# Patient Record
Sex: MALE | Race: WHITE | NOT HISPANIC OR LATINO | Employment: OTHER | URBAN - METROPOLITAN AREA
[De-identification: names, ages, dates, MRNs, and addresses within clinical notes are randomized per-mention and may not be internally consistent; named-entity substitution may affect disease eponyms.]

---

## 2017-01-05 ENCOUNTER — RECORDS - HEALTHEAST (OUTPATIENT)
Dept: LAB | Facility: CLINIC | Age: 62
End: 2017-01-05

## 2017-01-05 ENCOUNTER — RECORDS - HEALTHEAST (OUTPATIENT)
Dept: ADMINISTRATIVE | Facility: OTHER | Age: 62
End: 2017-01-05

## 2017-01-06 ENCOUNTER — AMBULATORY - HEALTHEAST (OUTPATIENT)
Dept: VASCULAR SURGERY | Facility: CLINIC | Age: 62
End: 2017-01-06

## 2017-01-06 ENCOUNTER — RECORDS - HEALTHEAST (OUTPATIENT)
Dept: ADMINISTRATIVE | Facility: OTHER | Age: 62
End: 2017-01-06

## 2017-01-06 LAB
CHOLEST SERPL-MCNC: 204 MG/DL
FASTING STATUS PATIENT QL REPORTED: NO
HDLC SERPL-MCNC: 40 MG/DL
LDLC SERPL CALC-MCNC: 106 MG/DL
LDLC SERPL CALC-MCNC: ABNORMAL MG/DL
TRIGL SERPL-MCNC: 429 MG/DL

## 2017-01-13 ENCOUNTER — COMMUNICATION - HEALTHEAST (OUTPATIENT)
Dept: VASCULAR SURGERY | Facility: CLINIC | Age: 62
End: 2017-01-13

## 2017-01-16 ENCOUNTER — RECORDS - HEALTHEAST (OUTPATIENT)
Dept: ADMINISTRATIVE | Facility: OTHER | Age: 62
End: 2017-01-16

## 2017-01-16 ENCOUNTER — RECORDS - HEALTHEAST (OUTPATIENT)
Dept: VASCULAR ULTRASOUND | Facility: CLINIC | Age: 62
End: 2017-01-16

## 2017-01-16 ENCOUNTER — OFFICE VISIT - HEALTHEAST (OUTPATIENT)
Dept: VASCULAR SURGERY | Facility: CLINIC | Age: 62
End: 2017-01-16

## 2017-01-16 DIAGNOSIS — Z87.891 PERSONAL HISTORY OF NICOTINE DEPENDENCE: ICD-10-CM

## 2017-01-16 DIAGNOSIS — M21.379 FOOT DROP: ICD-10-CM

## 2017-01-16 DIAGNOSIS — I73.9 PAD (PERIPHERAL ARTERY DISEASE) (H): ICD-10-CM

## 2017-01-16 DIAGNOSIS — E78.5 HYPERLIPIDEMIA: ICD-10-CM

## 2017-01-16 DIAGNOSIS — I73.9 PERIPHERAL VASCULAR DISEASE, UNSPECIFIED (H): ICD-10-CM

## 2017-01-16 DIAGNOSIS — Z87.891 HISTORY OF SMOKING: ICD-10-CM

## 2017-01-16 DIAGNOSIS — I73.9 CLAUDICATION (H): ICD-10-CM

## 2017-01-16 DIAGNOSIS — E78.5 HYPERLIPIDEMIA, UNSPECIFIED: ICD-10-CM

## 2017-01-16 ASSESSMENT — MIFFLIN-ST. JEOR: SCORE: 1723.4

## 2017-03-03 ENCOUNTER — RECORDS - HEALTHEAST (OUTPATIENT)
Dept: LAB | Facility: HOSPITAL | Age: 62
End: 2017-03-03

## 2017-03-03 LAB — HBA1C MFR BLD: 7.1 % (ref 4.2–6.1)

## 2017-03-06 LAB
ALBUMIN PERCENT: 62 % (ref 51–67)
ALBUMIN SERPL ELPH-MCNC: 4.2 G/DL (ref 3.2–4.7)
ALPHA 1 PERCENT: 2.3 % (ref 2–4)
ALPHA 2 PERCENT: 10 % (ref 5–13)
ALPHA1 GLOB SERPL ELPH-MCNC: 0.2 G/DL (ref 0.1–0.3)
ALPHA2 GLOB SERPL ELPH-MCNC: 0.7 G/DL (ref 0.4–0.9)
B-GLOBULIN SERPL ELPH-MCNC: 1 G/DL (ref 0.7–1.2)
BETA PERCENT: 14.7 % (ref 10–17)
GAMMA GLOB SERPL ELPH-MCNC: 0.7 G/DL (ref 0.6–1.4)
GAMMA GLOBULIN PERCENT: 11 % (ref 9–20)
PATH ICD:: NORMAL
PROT PATTERN SERPL ELPH-IMP: NORMAL
PROT SERPL-MCNC: 6.8 G/DL (ref 6–8)
REVIEWING PATHOLOGIST: NORMAL

## 2017-03-07 LAB
ANA SER QL: 0.2 U
SS-A/RO AUTOANTIBODIES - HISTORICAL: 2 EU
SS-B/LA AUTOANTIBODIES - HISTORICAL: 0 EU

## 2017-09-07 ENCOUNTER — RECORDS - HEALTHEAST (OUTPATIENT)
Dept: LAB | Facility: CLINIC | Age: 62
End: 2017-09-07

## 2017-09-07 LAB
CHOLEST SERPL-MCNC: 150 MG/DL
FASTING STATUS PATIENT QL REPORTED: NORMAL
HDLC SERPL-MCNC: 42 MG/DL
LDLC SERPL CALC-MCNC: 90 MG/DL
TRIGL SERPL-MCNC: 91 MG/DL

## 2018-12-10 ENCOUNTER — RECORDS - HEALTHEAST (OUTPATIENT)
Dept: LAB | Facility: CLINIC | Age: 63
End: 2018-12-10

## 2018-12-10 LAB
ALBUMIN SERPL-MCNC: 4 G/DL (ref 3.5–5)
ALP SERPL-CCNC: 81 U/L (ref 45–120)
ALT SERPL W P-5'-P-CCNC: 30 U/L (ref 0–45)
ANION GAP SERPL CALCULATED.3IONS-SCNC: 10 MMOL/L (ref 5–18)
AST SERPL W P-5'-P-CCNC: 24 U/L (ref 0–40)
BASOPHILS # BLD AUTO: 0.1 THOU/UL (ref 0–0.2)
BASOPHILS NFR BLD AUTO: 1 % (ref 0–2)
BILIRUB SERPL-MCNC: 0.5 MG/DL (ref 0–1)
BUN SERPL-MCNC: 10 MG/DL (ref 8–22)
CALCIUM SERPL-MCNC: 9.6 MG/DL (ref 8.5–10.5)
CHLORIDE BLD-SCNC: 106 MMOL/L (ref 98–107)
CHOLEST SERPL-MCNC: 181 MG/DL
CO2 SERPL-SCNC: 25 MMOL/L (ref 22–31)
CREAT SERPL-MCNC: 0.85 MG/DL (ref 0.7–1.3)
EOSINOPHIL # BLD AUTO: 0.6 THOU/UL (ref 0–0.4)
EOSINOPHIL NFR BLD AUTO: 9 % (ref 0–6)
ERYTHROCYTE [DISTWIDTH] IN BLOOD BY AUTOMATED COUNT: 11.9 % (ref 11–14.5)
FASTING STATUS PATIENT QL REPORTED: YES
GFR SERPL CREATININE-BSD FRML MDRD: >60 ML/MIN/1.73M2
GLUCOSE BLD-MCNC: 148 MG/DL (ref 70–125)
HCT VFR BLD AUTO: 45.3 % (ref 40–54)
HDLC SERPL-MCNC: 57 MG/DL
HGB BLD-MCNC: 15.1 G/DL (ref 14–18)
LDLC SERPL CALC-MCNC: 103 MG/DL
LYMPHOCYTES # BLD AUTO: 1.6 THOU/UL (ref 0.8–4.4)
LYMPHOCYTES NFR BLD AUTO: 25 % (ref 20–40)
MCH RBC QN AUTO: 31.6 PG (ref 27–34)
MCHC RBC AUTO-ENTMCNC: 33.3 G/DL (ref 32–36)
MCV RBC AUTO: 95 FL (ref 80–100)
MONOCYTES # BLD AUTO: 0.5 THOU/UL (ref 0–0.9)
MONOCYTES NFR BLD AUTO: 7 % (ref 2–10)
NEUTROPHILS # BLD AUTO: 3.8 THOU/UL (ref 2–7.7)
NEUTROPHILS NFR BLD AUTO: 59 % (ref 50–70)
PLATELET # BLD AUTO: 212 THOU/UL (ref 140–440)
PMV BLD AUTO: 11 FL (ref 8.5–12.5)
POTASSIUM BLD-SCNC: 4.6 MMOL/L (ref 3.5–5)
PROT SERPL-MCNC: 7 G/DL (ref 6–8)
PSA SERPL-MCNC: 0.2 NG/ML (ref 0–4.5)
RBC # BLD AUTO: 4.78 MILL/UL (ref 4.4–6.2)
SODIUM SERPL-SCNC: 141 MMOL/L (ref 136–145)
TRIGL SERPL-MCNC: 107 MG/DL
VIT B12 SERPL-MCNC: 561 PG/ML (ref 213–816)
WBC: 6.4 THOU/UL (ref 4–11)

## 2020-01-02 ENCOUNTER — RECORDS - HEALTHEAST (OUTPATIENT)
Dept: LAB | Facility: CLINIC | Age: 65
End: 2020-01-02

## 2020-01-02 LAB
ALBUMIN SERPL-MCNC: 4.2 G/DL (ref 3.5–5)
ALP SERPL-CCNC: 80 U/L (ref 45–120)
ALT SERPL W P-5'-P-CCNC: 37 U/L (ref 0–45)
ANION GAP SERPL CALCULATED.3IONS-SCNC: 12 MMOL/L (ref 5–18)
AST SERPL W P-5'-P-CCNC: 33 U/L (ref 0–40)
BILIRUB SERPL-MCNC: 0.7 MG/DL (ref 0–1)
BUN SERPL-MCNC: 18 MG/DL (ref 8–22)
CALCIUM SERPL-MCNC: 9.3 MG/DL (ref 8.5–10.5)
CHLORIDE BLD-SCNC: 104 MMOL/L (ref 98–107)
CHOLEST SERPL-MCNC: 174 MG/DL
CO2 SERPL-SCNC: 21 MMOL/L (ref 22–31)
CREAT SERPL-MCNC: 0.92 MG/DL (ref 0.7–1.3)
FASTING STATUS PATIENT QL REPORTED: YES
GFR SERPL CREATININE-BSD FRML MDRD: >60 ML/MIN/1.73M2
GLUCOSE BLD-MCNC: 177 MG/DL (ref 70–125)
HDLC SERPL-MCNC: 45 MG/DL
LDLC SERPL CALC-MCNC: 98 MG/DL
POTASSIUM BLD-SCNC: 4.1 MMOL/L (ref 3.5–5)
PROT SERPL-MCNC: 7.2 G/DL (ref 6–8)
PSA SERPL-MCNC: 0.2 NG/ML (ref 0–4.5)
SODIUM SERPL-SCNC: 137 MMOL/L (ref 136–145)
TRIGL SERPL-MCNC: 153 MG/DL
VIT B12 SERPL-MCNC: 482 PG/ML (ref 213–816)

## 2020-01-03 LAB — HBA1C MFR BLD: 6.9 % (ref 4.2–6.1)

## 2020-11-10 ENCOUNTER — RECORDS - HEALTHEAST (OUTPATIENT)
Dept: LAB | Facility: CLINIC | Age: 65
End: 2020-11-10

## 2020-11-10 LAB
ANION GAP SERPL CALCULATED.3IONS-SCNC: 10 MMOL/L (ref 5–18)
BUN SERPL-MCNC: 17 MG/DL (ref 8–22)
CALCIUM SERPL-MCNC: 9.4 MG/DL (ref 8.5–10.5)
CHLORIDE BLD-SCNC: 101 MMOL/L (ref 98–107)
CO2 SERPL-SCNC: 25 MMOL/L (ref 22–31)
CREAT SERPL-MCNC: 0.96 MG/DL (ref 0.7–1.3)
GFR SERPL CREATININE-BSD FRML MDRD: >60 ML/MIN/1.73M2
GLUCOSE BLD-MCNC: 209 MG/DL (ref 70–125)
POTASSIUM BLD-SCNC: 5.2 MMOL/L (ref 3.5–5)
SODIUM SERPL-SCNC: 136 MMOL/L (ref 136–145)

## 2020-11-17 ENCOUNTER — RECORDS - HEALTHEAST (OUTPATIENT)
Dept: LAB | Facility: CLINIC | Age: 65
End: 2020-11-17

## 2020-11-17 LAB
SARS-COV-2 PCR COMMENT: NORMAL
SARS-COV-2 RNA SPEC QL NAA+PROBE: NEGATIVE
SARS-COV-2 VIRUS SPECIMEN SOURCE: NORMAL

## 2021-01-25 ENCOUNTER — RECORDS - HEALTHEAST (OUTPATIENT)
Dept: ADMINISTRATIVE | Facility: OTHER | Age: 66
End: 2021-01-25

## 2021-02-19 ENCOUNTER — AMBULATORY - HEALTHEAST (OUTPATIENT)
Dept: SURGERY | Facility: CLINIC | Age: 66
End: 2021-02-19

## 2021-02-19 DIAGNOSIS — Z11.59 ENCOUNTER FOR SCREENING FOR OTHER VIRAL DISEASES: ICD-10-CM

## 2021-02-24 ENCOUNTER — RECORDS - HEALTHEAST (OUTPATIENT)
Dept: LAB | Facility: CLINIC | Age: 66
End: 2021-02-24

## 2021-02-24 LAB
CHOLEST SERPL-MCNC: 166 MG/DL
FASTING STATUS PATIENT QL REPORTED: NO
HDLC SERPL-MCNC: 33 MG/DL
LDLC SERPL CALC-MCNC: 87 MG/DL
TRIGL SERPL-MCNC: 228 MG/DL

## 2021-02-24 ASSESSMENT — MIFFLIN-ST. JEOR
SCORE: 1662.16
SCORE: 1662.16

## 2021-02-25 ENCOUNTER — AMBULATORY - HEALTHEAST (OUTPATIENT)
Dept: LAB | Facility: CLINIC | Age: 66
End: 2021-02-25

## 2021-02-25 DIAGNOSIS — Z11.59 ENCOUNTER FOR SCREENING FOR OTHER VIRAL DISEASES: ICD-10-CM

## 2021-02-27 ENCOUNTER — COMMUNICATION - HEALTHEAST (OUTPATIENT)
Dept: SCHEDULING | Facility: CLINIC | Age: 66
End: 2021-02-27

## 2021-02-28 ENCOUNTER — ANESTHESIA - HEALTHEAST (OUTPATIENT)
Dept: SURGERY | Facility: CLINIC | Age: 66
End: 2021-02-28

## 2021-03-01 ENCOUNTER — HOSPITAL ENCOUNTER (INPATIENT)
Dept: MEDSURG UNIT | Facility: CLINIC | Age: 66
Discharge: HOME | End: 2021-03-04
Attending: ORTHOPAEDIC SURGERY | Admitting: ORTHOPAEDIC SURGERY
Payer: COMMERCIAL

## 2021-03-01 ENCOUNTER — SURGERY - HEALTHEAST (OUTPATIENT)
Dept: SURGERY | Facility: CLINIC | Age: 66
End: 2021-03-01

## 2021-03-01 DIAGNOSIS — M48.062 NEUROGENIC CLAUDICATION DUE TO LUMBAR SPINAL STENOSIS: ICD-10-CM

## 2021-03-01 LAB
GLUCOSE BLDC GLUCOMTR-MCNC: 231 MG/DL (ref 70–139)
GLUCOSE BLDC GLUCOMTR-MCNC: 249 MG/DL (ref 70–139)
GLUCOSE BLDC GLUCOMTR-MCNC: 288 MG/DL (ref 70–139)
GLUCOSE BLDC GLUCOMTR-MCNC: 291 MG/DL (ref 70–139)
HGB BLD-MCNC: 14 G/DL (ref 14–18)

## 2021-03-01 ASSESSMENT — MIFFLIN-ST. JEOR
SCORE: 1640.38
SCORE: 1640.38

## 2021-03-02 LAB
GLUCOSE BLDC GLUCOMTR-MCNC: 209 MG/DL (ref 70–139)
GLUCOSE BLDC GLUCOMTR-MCNC: 213 MG/DL (ref 70–139)
GLUCOSE BLDC GLUCOMTR-MCNC: 240 MG/DL (ref 70–139)
GLUCOSE BLDC GLUCOMTR-MCNC: 307 MG/DL (ref 70–139)

## 2021-03-03 LAB
GLUCOSE BLDC GLUCOMTR-MCNC: 141 MG/DL (ref 70–139)
GLUCOSE BLDC GLUCOMTR-MCNC: 173 MG/DL (ref 70–139)
GLUCOSE BLDC GLUCOMTR-MCNC: 212 MG/DL (ref 70–139)
GLUCOSE BLDC GLUCOMTR-MCNC: 248 MG/DL (ref 70–139)

## 2021-03-03 ASSESSMENT — MIFFLIN-ST. JEOR
SCORE: 1640.38
SCORE: 1640.38

## 2021-03-04 LAB
GLUCOSE BLDC GLUCOMTR-MCNC: 167 MG/DL (ref 70–139)
HGB BLD-MCNC: 12.6 G/DL (ref 14–18)

## 2021-03-12 ENCOUNTER — RECORDS - HEALTHEAST (OUTPATIENT)
Dept: LAB | Facility: CLINIC | Age: 66
End: 2021-03-12

## 2021-03-12 LAB
ALBUMIN SERPL-MCNC: 3.7 G/DL (ref 3.5–5)
ALP SERPL-CCNC: 109 U/L (ref 45–120)
ALT SERPL W P-5'-P-CCNC: 20 U/L (ref 0–45)
ANION GAP SERPL CALCULATED.3IONS-SCNC: 10 MMOL/L (ref 5–18)
AST SERPL W P-5'-P-CCNC: 17 U/L (ref 0–40)
BILIRUB SERPL-MCNC: 0.3 MG/DL (ref 0–1)
BUN SERPL-MCNC: 12 MG/DL (ref 8–22)
CALCIUM SERPL-MCNC: 9.1 MG/DL (ref 8.5–10.5)
CHLORIDE BLD-SCNC: 104 MMOL/L (ref 98–107)
CO2 SERPL-SCNC: 24 MMOL/L (ref 22–31)
CREAT SERPL-MCNC: 0.85 MG/DL (ref 0.7–1.3)
FERRITIN SERPL-MCNC: 301 NG/ML (ref 27–300)
GFR SERPL CREATININE-BSD FRML MDRD: >60 ML/MIN/1.73M2
GLUCOSE BLD-MCNC: 214 MG/DL (ref 70–125)
IRON SATN MFR SERPL: 17 % (ref 20–50)
IRON SERPL-MCNC: 55 UG/DL (ref 42–175)
POTASSIUM BLD-SCNC: 4.3 MMOL/L (ref 3.5–5)
PROT SERPL-MCNC: 6.9 G/DL (ref 6–8)
PSA SERPL-MCNC: 0.2 NG/ML (ref 0–4.5)
SODIUM SERPL-SCNC: 138 MMOL/L (ref 136–145)
TIBC SERPL-MCNC: 318 UG/DL (ref 313–563)
TRANSFERRIN SERPL-MCNC: 255 MG/DL (ref 212–360)

## 2021-05-30 VITALS — HEIGHT: 71 IN | WEIGHT: 202 LBS | BODY MASS INDEX: 28.28 KG/M2

## 2021-06-05 VITALS
WEIGHT: 187.2 LBS | BODY MASS INDEX: 26.8 KG/M2 | BODY MASS INDEX: 26.8 KG/M2 | HEIGHT: 70 IN | HEIGHT: 70 IN | WEIGHT: 187.2 LBS

## 2021-06-08 NOTE — PROGRESS NOTES
" VASCULAR SURGERY CLINIC CONSULTATION    VASCULAR SURGEON: Alcides Weinstein MD     LOCATION:  [place] VASCULAR [facility]    Hardeep Silvestre   Medical Record #:  [id number]  YOB: 1955  Age:  61 y.o.     Date of Service: 1/16/2017    PRIMARY CARE PROVIDER: Levy Rodriguez MD  Requesting Provider: Levy Rodriguez MD     Reason for visit:  Foot numbness, pain and calf pain    IMPRESSION:    Patient's distribution of symptoms is most consistent with a neuropathy.There may also be a component of arterial insufficiency.    RECOMMENDATION:    Physiologic arterial evaluation (ie., CECY's with and without exercise)  Further management is pending these results  He should have Physical Therapy Evaluation and Treatment for core strengthening and exercise program.  If he does have arterial insufficiency, he will be started on an exercise program.  All questions answered. Patient and his wife agree with management.    ADDENDUM: Segmental limb pressures and CECY's w/woexercise were reviewed. These are consistent with SFA/Popliteal artery occlusive disease, bilaterally. This may accunt for the calf cramping with ambulation,but not the neuropthic process.  I had a lenghty discussion with patient ahnd his wife regarding natural history and treatment opotions. He will initially be treataed with Claudication walking program. He will follow up with me in 3-6 mos. Further mgmt will be dependent on his response to the walking program, Physical therapy and risk factor optimization. He would benefit from a PHYSICAL THRAPY evaluation and treatment for his left foot drop and back disorder. He may benefit from a left AFO.  All questions answered. Patient and his wife agree with management.      HPI:  Hardeep Silvestre is a 61 y.o. male who was seen today in consultation for foot and leg pain/burning.  He states that he has constant \"numbness \" in his feet (plantar aspect) which has sensation of frostbite when he walks. He also " develops pain in calf,bilaterally after walking approx 50 yards. He doses walk significantly at work. He feels as if his calf (bilat) has been hit with a baseball bat at rest and with walking. He has had back surgery (Lumbar) twice. The initial surgery relieved his thigh cramping. Prior heavy smoker of cigarettes(1-2 PPD for 30 years). No cigarettes since 2003. He has known CAD and is s/p PTCA in 2003.No current CP.  He states that his diabetes tests have been at the lower end  For diabetes mellitus (actual test performed and results is not available). He is not on any exercise program. He had PT through [facility] prior to initial back surgery he felt that he developed right leg pain in addition to left as a result. He has not had any physical therapy or been on any exercise program for several yeaars.d      PHH:    Past Medical History   Diagnosis Date     Claudication      PAD (peripheral artery disease)         Past Surgical History   Procedure Laterality Date     Shoulder surgery       Coronary angioplasty with stent placement         ALLERGIES:  Review of patient's allergies indicates no known allergies.    MEDS:    Current Outpatient Prescriptions:      aspirin 81 MG EC tablet, Take 81 mg by mouth daily., Disp: , Rfl:      atenolol (TENORMIN) 25 MG tablet, TK 1 T PO ONCE D FOR HIGH BLOOD PRESSURE, Disp: , Rfl: 3     atorvastatin (LIPITOR) 10 MG tablet, TK 1 T PO  ONCE D HS, Disp: , Rfl: 3     fluticasone (CUTIVATE) 0.05 % cream, ENDER TO FACE BID FOR 1 WEEK THEN REDUCE TO 3 DAYS PER WEEK PRN, Disp: , Rfl: 1     gabapentin (NEURONTIN) 100 MG capsule, TK 1 C PO TID, Disp: , Rfl: 2     omeprazole (PRILOSEC) 20 MG capsule, Take 20 mg by mouth Daily before breakfast., Disp: , Rfl:      nitroglycerin (NITROSTAT) 0.4 MG SL tablet, Place 0.4 mg under the tongue every 5 (five) minutes as needed for chest pain., Disp: , Rfl:     SOCIAL HABITS:    History   Smoking Status     Former Smoker      "Packs/day: 2.00     Types: Cigarettes     Quit date: 1/16/2003   Smokeless Tobacco     Never Used       History   Alcohol Use     Yes       History   Drug Use No       FAMILY HISTORY:    Family History   Problem Relation Age of Onset     Diabetes Mother      Liver disease Mother      Coronary artery disease Father      Heart failure Father        REVIEW OF SYSTEMS:  12 point ros reviewed    PE:  Visit Vitals     /60 (Patient Site: Left Arm, Patient Position: Sitting, Cuff Size: Adult Regular)     Pulse 78     Resp 14     Ht 5' 11\" (1.803 m)     Wt 202 lb (91.6 kg)     BMI 28.17 kg/m2       HEENT:  nl   Chest:  nl  Lungs:  No wheezing  Heart:  Rrr. + pulses at car,rad,fem, dp palpable,bilat. No popliteal, femoral or abdominal aortic aneurysms palpable  Abd:  Soft. No tenderness or masses  Ext:  No c/c/e  Skin: intact  Neuro:Strenght5/5 through out. Normal sensation to light touch in feet and legs. No pain with straight leg lift with and without resistance      DIAGNOSTIC STUDIES:    CECY's with exercise pending                    Alcides Weinstein MD  VASCULAR SURGERY     [facility]      "

## 2021-06-08 NOTE — PROGRESS NOTES
61 year old male, consult for PVD, direct ref to Dr. Alcides Weinstein. Patient did undergoe EMG w/ [facility]. Patient has Hx of leg and foot numbness, on gapentin, EMG was negative for neuropathy.  MRI done at [facility] for eval of spinal stenosis, has had spinal surgery in the past. Patient reports claudication in calves after roughly 50 feet.  patient has some hair on his legs and toes. Patient has left foot drop.

## 2021-06-10 ENCOUNTER — OFFICE VISIT (OUTPATIENT)
Dept: NEUROLOGY | Facility: CLINIC | Age: 66
End: 2021-06-10
Payer: COMMERCIAL

## 2021-06-10 VITALS
SYSTOLIC BLOOD PRESSURE: 177 MMHG | DIASTOLIC BLOOD PRESSURE: 103 MMHG | BODY MASS INDEX: 27.63 KG/M2 | HEART RATE: 69 BPM | HEIGHT: 70 IN | WEIGHT: 193 LBS

## 2021-06-10 DIAGNOSIS — R79.9 ABNORMAL FINDING OF BLOOD CHEMISTRY, UNSPECIFIED: ICD-10-CM

## 2021-06-10 DIAGNOSIS — G62.9 PERIPHERAL POLYNEUROPATHY: Primary | ICD-10-CM

## 2021-06-10 PROBLEM — I73.9 PERIPHERAL ARTERIAL DISEASE (H): Status: ACTIVE | Noted: 2017-07-21

## 2021-06-10 PROBLEM — I25.10 CORONARY ARTERY DISEASE INVOLVING NATIVE CORONARY ARTERY OF NATIVE HEART WITHOUT ANGINA PECTORIS: Status: ACTIVE | Noted: 2021-06-10

## 2021-06-10 PROBLEM — K90.0 CELIAC DISEASE: Status: ACTIVE | Noted: 2017-07-16

## 2021-06-10 PROBLEM — E78.5 HYPERLIPIDEMIA: Status: ACTIVE | Noted: 2017-07-31

## 2021-06-10 PROBLEM — I10 ESSENTIAL HYPERTENSION: Status: ACTIVE | Noted: 2021-05-11

## 2021-06-10 PROBLEM — M48.062 NEUROGENIC CLAUDICATION DUE TO LUMBAR SPINAL STENOSIS: Status: ACTIVE | Noted: 2021-03-01

## 2021-06-10 PROBLEM — E11.69 TYPE 2 DIABETES MELLITUS WITH OTHER SPECIFIED COMPLICATION, WITHOUT LONG-TERM CURRENT USE OF INSULIN (H): Status: ACTIVE | Noted: 2021-05-11

## 2021-06-10 PROCEDURE — 99205 OFFICE O/P NEW HI 60 MIN: CPT | Performed by: PSYCHIATRY & NEUROLOGY

## 2021-06-10 RX ORDER — NITROGLYCERIN 0.4 MG/1
0.4 TABLET SUBLINGUAL
COMMUNITY

## 2021-06-10 RX ORDER — LOSARTAN POTASSIUM 50 MG/1
50 TABLET ORAL
COMMUNITY
Start: 2021-05-11

## 2021-06-10 RX ORDER — ACETAMINOPHEN 500 MG
1000 TABLET ORAL
COMMUNITY
Start: 2021-03-04

## 2021-06-10 RX ORDER — TRAMADOL HYDROCHLORIDE 50 MG/1
50 TABLET ORAL EVERY 8 HOURS PRN
Qty: 90 TABLET | Refills: 0 | Status: SHIPPED | OUTPATIENT
Start: 2021-06-10 | End: 2021-08-03

## 2021-06-10 RX ORDER — ASPIRIN 81 MG
1 TABLET, DELAYED RELEASE (ENTERIC COATED) ORAL
COMMUNITY

## 2021-06-10 RX ORDER — CLOPIDOGREL BISULFATE 75 MG/1
75 TABLET ORAL
COMMUNITY
Start: 2021-05-20 | End: 2022-01-10

## 2021-06-10 RX ORDER — METFORMIN HCL 500 MG
1000 TABLET, EXTENDED RELEASE 24 HR ORAL
COMMUNITY
Start: 2021-05-25

## 2021-06-10 RX ORDER — ROSUVASTATIN CALCIUM 20 MG/1
20 TABLET, COATED ORAL
COMMUNITY
Start: 2021-05-25

## 2021-06-10 RX ORDER — GABAPENTIN 300 MG/1
CAPSULE ORAL
COMMUNITY
Start: 2021-04-16 | End: 2021-08-09

## 2021-06-10 RX ORDER — ATENOLOL 25 MG/1
25 TABLET ORAL
COMMUNITY
Start: 2021-05-25

## 2021-06-10 SDOH — HEALTH STABILITY: MENTAL HEALTH: HOW MANY STANDARD DRINKS CONTAINING ALCOHOL DO YOU HAVE ON A TYPICAL DAY?: 1 OR 2

## 2021-06-10 SDOH — HEALTH STABILITY: MENTAL HEALTH: HOW OFTEN DO YOU HAVE 6 OR MORE DRINKS ON ONE OCCASION?: NOT ASKED

## 2021-06-10 SDOH — HEALTH STABILITY: MENTAL HEALTH: HOW OFTEN DO YOU HAVE A DRINK CONTAINING ALCOHOL?: 2-3 TIMES A WEEK

## 2021-06-10 ASSESSMENT — MIFFLIN-ST. JEOR: SCORE: 1666.69

## 2021-06-10 NOTE — NURSING NOTE
Chief Complaint   Patient presents with     NEUROPATHY     BLE paresthesias- has had for many years but worsening      Kisha Paz CMA on 6/10/2021 at 11:18 AM

## 2021-06-10 NOTE — LETTER
6/10/2021        RE: Hardeep Silvestre  [address] [place] [address]        NEUROLOGY CONSULTATION NOTE       [facility] NEUROLOGY [employer]  [address] [place] [address]  Tel: [phone]  Fax: [phone]  [email or web address]     Hardeep Silvestre,  1955, MRN [id number]  PCP: Gregory Tariq  Date: 06/10/2021     ASSESSMENT & PLAN     Diagnosis code  1. Peripheral polyneuropathy     Peripheral polyneuropathy likely secondary to DM  65-year-old male with history of coronary artery disease, peripheral arterial disease, lumbar radiculopathy status post fusion, diabetes with a long history of neuropathy previously evaluated at [facility] and also in clinic in 2017 returns for follow-up with similar symptoms.  Clinically he has peripheral neuropathy likely due to his diabetes but previous EMG were unremarkable and showed chronic L5 changes due to his previous radiculopathy.  This was labeled as a small fiber neuropathy.  Currently patient is in significant discomfort and I suspect his symptoms are due to his peripheral arterial disease, peripheral neuropathy and chronic back issues that required fusion.  I have recommended:    1.  EMG of the lower extremity  2.  Repeat lab work to include antinuclear antibody, angiotensin-converting enzyme, folate, hemoglobin A1c, Lyme titer, methylmalonic acid level, SPEP, immunofixation, vitamin B1, vitamin B6 and vitamin B12  3.  Continue gabapentin 300 mg 1 tablet in the morning, 1 tablet in the afternoon and 3 at bedtime  4.  Add tramadol 50 mg 3 times daily if needed.  After above work-up I plan on switching him to nortriptyline or Cymbalta.  I should note in 2017 he was tried on combination of gabapentin and Cymbalta but did not find it very effective  5.  I have encouraged him to cut down on his drinking and have a tighter glycemic control with goal of hemoglobin A1c less than 6  6.  Follow-up after above tests.    Thank  you again for this referral, please feel free to contact me if you have any questions.    Jamal Barahona MD  [facility]  (Formerly, [facility]     REASON FOR CONSULTATION NEUROPATHY        HISTORY OF PRESENT ILLNESS     We have been requested by Dr. Gaona to evaluate Hardeep Silvestre who is a 65 year old  male for peripheral neuropathy    Patient is a 65-year-old male with history of coronary artery disease, peripheral arterial disease, hypertension, hyperlipidemia, spinal stenosis status post lumbar fusion was referred for evaluation of bilateral feet numbness and tingling that started more than 17 years ago.  Initially patient had some back pain and was noted to have left L5 radiculopathy.  He developed foot drop and had surgery done with improvement.  About 3 years ago due to persistent symptoms he was seen at [facility] and had a EMG that showed chronic left L5 radiculopathy but no compelling evidence of a large fiber peripheral neuropathy.  He had autonomic reflex screening at that time also that raise the possibility of early small fiber neuropathy.  Afterwards he also was evaluated by vascular surgery and had stent placed and was told that there is nothing much that can be done.  According to patient after evaluation at [facility] he was put on gabapentin for a short duration that he discontinued.  Recently his symptoms got worse and was evaluated by orthopedics and he had surgery done in March 2021 and had lumbar fusion done.  However he did not notice any improvement.  Although he is tried on different medication he feels only time he had any improvement was when he was on tramadol.  In addition to burning and tingling sensation in his leg he also reports impotence.  There is no history of any excessive constipation and postural hypotension.    Patient was evaluated in our clinic in 2017 with similar symptoms and at that time had an elevated  hemoglobin A1c at 7.1, SPEP, antinuclear antibody, SSA/SSB antibody, rheumatoid factor, Lyme titer were normal.  He was tried on the Loxitane and gabapentin but did not find it very effective.     PROBLEM LIST   Patient Active Problem List   Diagnosis Code     Coronary artery disease involving native coronary artery of native heart without angina pectoris I25.10     Celiac disease K90.0     Type 2 diabetes mellitus with other specified complication, without long-term current use of insulin (H) E11.69     Essential hypertension I10     Hyperlipidemia E78.5     Neurogenic claudication due to lumbar spinal stenosis M48.062     Peripheral arterial disease (H) I73.9         PAST MEDICAL & SURGICAL HISTORY     Past Medical History:   Patient  has no past medical history on file.    Surgical History:  He  has a past surgical history that includes BILATERAL LUMBAR 3-4, LUMBAR 4-5 TRANSFORAMINAL LUMBAR INTERBODY FUSION; CORONARY ANGIOPLASTY WITH STENT PLACEMENT; and hernia repair.     SOCIAL HISTORY     Reviewed, and he  reports that he quit smoking about 18 years ago. His smoking use included cigarettes. He smoked 2.00 packs per day. He has never used smokeless tobacco. He reports current alcohol use.     FAMILY HISTORY     Reviewed, and family history includes Coronary Artery Disease in his father; Diabetes in his mother; Heart Failure in his father; Liver Disease in his mother.     ALLERGIES     Allergies   Allergen Reactions     Macadamia Nut Oil Anaphylaxis     Atorvastatin Muscle Pain (Myalgia)         REVIEW OF SYSTEMS     A 12 point review of system was performed and was negative except as outlined in the history of present illness.     HOME MEDICATIONS     Current Outpatient Rx   Medication Sig Dispense Refill     acetaminophen (TYLENOL) 500 MG tablet Take 1,000 mg by mouth       aspirin (ASA) 81 MG EC tablet Take 81 mg by mouth       atenolol (TENORMIN) 25 MG tablet Take 25 mg by mouth       clopidogrel (PLAVIX) 75  "MG tablet Take 75 mg by mouth       gabapentin (NEURONTIN) 300 MG capsule TAKE 1 CAPSULE BY MOUTH EVERY MORNING AND 1 CAPSULE IN THE AFTERNOON AND 3 CAPSULES AT BEDTIME       losartan (COZAAR) 50 MG tablet Take 50 mg by mouth       metFORMIN (GLUCOPHAGE-XR) 500 MG 24 hr tablet Take 1,000 mg by mouth       multivitamin, therapeutic with minerals (THERA-VIT-M) TABS tablet Take 1 tablet by mouth       nitroGLYcerin (NITROSTAT) 0.4 MG sublingual tablet Place 0.4 mg under the tongue       omeprazole (PRILOSEC) 20 MG DR capsule Take 20 mg by mouth       rosuvastatin (CRESTOR) 20 MG tablet Take 20 mg by mouth       traMADol (ULTRAM) 50 MG tablet Take 1 tablet (50 mg) by mouth every 8 hours as needed for pain 90 tablet 0         PHYSICAL EXAM     Vital signs  BP (!) 177/103 (BP Location: Right arm, Patient Position: Sitting)   Pulse 69   Ht 1.778 m (5' 10\")   Wt 87.5 kg (193 lb)   BMI 27.69 kg/m      Weight:   193 lbs 0 oz    Patient is alert and oriented x4 in no acute distress. Vital signs were reviewed and are documented in electronic medical record. Neck was supple, no carotid bruits, thyromegaly, JVD, or lymphadenopathy was noted.   NEUROLOGY EXAM:    Patient s speech was normal with no aphasia or dysarthria. Mentation, and affect were also normal.     Funduscopic exam was normal, with normal cup to disc ratio. Cranial nerves II -XII were intact.     Patient had normal mass, tone and motor strength was 5/5 in all extremities without pronator drift.     Sensation was decreased to light touch pinprick vibratory and temperature sensation below knees    Reflexes were 2+ in the upper extremity 1+ at knees absent at ankles    No dysmetria noted on FNF or HKS. Romberg was negative.    Gait testing was normal. Able to walk on toes/heels. Tandem walk normal.     DIAGNOSTIC STUDIES     PERTINENT RADIOLOGY  Following imaging studies were reviewed:     MRI LUMBAR SPINE 1/25/2021  1.  Unchanged mild L4-L5 degenerative disc " disease with an unchanged small diffuse posterior disc bulge and previous postoperative changes of her treatment compressive laminectomy.  There is a small diffuse posterior disc bulge contributes to a mild midline spinal canal stenosis with mild narrowing of the right lateral recess, where there is redemonstrated slight contact of the traversing right L5 nerve root  2.  Unchanged moderate midline L3-L4 spinal canal stenosis with slight narrowing of the right lateral recess and mild narrowing of the left L3-L4 lateral recess.  3.  Unchanged moderate bilateral L3-L4 and moderate left L4-L5 neuroforaminal stenosis  4.  Moderate bilateral L3-L4 facet arthropathy with interval development of small facet joint effusion.  This is accompanied by a small amount of adjacent degenerative osseous edema  5.  Additional severe bilateral L4-L5 facet arthropathy with unchanged 2 mm degenerative anterolisthesis of L3 on L4 and L4 and L5  6.  Relatively short pedicles in the lower lumbar spine contribute to a diffuse congenital spinal canal narrowing    MRI LUMBAR SPINE 3/9/2017  Multilevel degenerative lumbar spondylosis, a  developmentally narrowed mid to lower lumbar spinal canal, interval  bilateral decompressive laminectomies at L4-5 and the following  notable findings:  1.  Decreased size of 2 to 3 mm mildly cranially directed L4-5 right  paracentral disc protrusion with mild central stenosis, but no  traversing neural compression.  2.  Unchanged chronic moderate L3-4 and mild L2-3 central stenosis  without traversing neural compression.  3.  No acute fracture, spondylolysis or evidence of arachnoidal  adhesive disease.  4.  Chronic mild right/mild to moderate left L4-5 and mild bilateral  L3-4 foraminal stenosis without ganglionic compression.  5.  Mild bilateral L5-S1, moderate right/mild to moderate left L4-5  and moderate left/mild to moderate right L3-4 facet arthrosis.    AUTONOMIC REFLEX SCREEN 7/6/2017  There is  evidence of focal postganglionic sudomotor dysfunction while cardiovagal and adrenergic functions are preserved. Local factors could be responsible for the result, but an early small fiber neuropathy cannot be excluded.    EMG 7/5/2017  Abnormal study. There is electrophysiologic evidence of a chronic,   inactive, left L5 lumbar radiculopathy. There is no compelling evidence of large fiber peripheral neuropathy.      PERTINENT LABS  Following labs were reviewed:   Ref Range & Units  5/20/2021   SODIUM 135 - 145 mmol/L 139    POTASSIUM 3.5 - 5.0 mmol/L 3.9    CHLORIDE 98 - 110 mmol/L 105    CO2,TOTAL 21 - 31 mmol/L 21    ANION GAP 5 - 18  13    GLUCOSE 65 - 100 mg/dL 199High     CALCIUM 8.5 - 10.5 mg/dL 9.6    BUN 8 - 25 mg/dL 12    CREATININE 0.72 - 1.25 mg/dL 1.02    BUN/CREAT RATIO           10 - 20  12    GFR if African American >60 ml/min/1.73m2 >60    GFR if not African American >60 ml/min/1.73m2 >60                 Total time spent for face to face visit, reviewing labs/imaging studies, counseling and coordination of care was: 1 Hour 15 Minutes spent on the date of the encounter doing chart review, review of outside records, review of test results, interpretation of tests, patient visit, documentation and discussion with family       This note was dictated using voice recognition software.  Any grammatical or context distortions are unintentional and inherent to the software.    Orders Placed This Encounter   Procedures     PANDA w/Reflex ([facility])     Angiotensin converting enzyme     Folate     Hemoglobin A1c     Lyme Disease Ab, Total and IgM, Reflex to WB ([facility])     Methylmalonic Acid     Protein electrophoresis     Protein Immunofixation Serum     Vitamin B1 plasma     Vitamin B12     Vitamin B6 ([facility])     EMG      New Prescriptions    TRAMADOL (ULTRAM) 50 MG TABLET    Take 1 tablet (50 mg) by mouth every 8 hours as needed for pain      Modified Medications    No medications on file                 Sincerely,        Jamal Barahona MD

## 2021-06-10 NOTE — PROGRESS NOTES
NEUROLOGY CONSULTATION NOTE       [facility] NEUROLOGY [place]  [address] [place] [address]  Tel: [phone]  Fax: [phone]  [email or web address]     Hardeep Silvestre,  1955, MRN [id number]  PCP: Gregory Tariq  Date: 06/10/2021     ASSESSMENT & PLAN     Diagnosis code  1. Peripheral polyneuropathy     Peripheral polyneuropathy likely secondary to DM  65-year-old male with history of coronary artery disease, peripheral arterial disease, lumbar radiculopathy status post fusion, diabetes with a long history of neuropathy previously evaluated at [facility] and also in clinic in 2017 returns for follow-up with similar symptoms.  Clinically he has peripheral neuropathy likely due to his diabetes but previous EMG were unremarkable and showed chronic L5 changes due to his previous radiculopathy.  This was labeled as a small fiber neuropathy.  Currently patient is in significant discomfort and I suspect his symptoms are due to his peripheral arterial disease, peripheral neuropathy and chronic back issues that required fusion.  I have recommended:    1.  EMG of the lower extremity  2.  Repeat lab work to include antinuclear antibody, angiotensin-converting enzyme, folate, hemoglobin A1c, Lyme titer, methylmalonic acid level, SPEP, immunofixation, vitamin B1, vitamin B6 and vitamin B12  3.  Continue gabapentin 300 mg 1 tablet in the morning, 1 tablet in the afternoon and 3 at bedtime  4.  Add tramadol 50 mg 3 times daily if needed.  After above work-up I plan on switching him to nortriptyline or Cymbalta.  I should note in 2017 he was tried on combination of gabapentin and Cymbalta but did not find it very effective  5.  I have encouraged him to cut down on his drinking and have a tighter glycemic control with goal of hemoglobin A1c less than 6  6.  Follow-up after above tests.    Thank you again for this referral, please feel free to contact me if you have any  questions.    Jamal Barahona MD  [facility]  (Formerly, [facility]     REASON FOR CONSULTATION NEUROPATHY        HISTORY OF PRESENT ILLNESS     We have been requested by Dr. Gaona to evaluate Hardeep Silvestre who is a 65 year old  male for peripheral neuropathy    Patient is a 65-year-old male with history of coronary artery disease, peripheral arterial disease, hypertension, hyperlipidemia, spinal stenosis status post lumbar fusion was referred for evaluation of bilateral feet numbness and tingling that started more than 17 years ago.  Initially patient had some back pain and was noted to have left L5 radiculopathy.  He developed foot drop and had surgery done with improvement.  About 3 years ago due to persistent symptoms he was seen at [facility] and had a EMG that showed chronic left L5 radiculopathy but no compelling evidence of a large fiber peripheral neuropathy.  He had autonomic reflex screening at that time also that raise the possibility of early small fiber neuropathy.  Afterwards he also was evaluated by vascular surgery and had stent placed and was told that there is nothing much that can be done.  According to patient after evaluation at [facility] he was put on gabapentin for a short duration that he discontinued.  Recently his symptoms got worse and was evaluated by orthopedics and he had surgery done in March 2021 and had lumbar fusion done.  However he did not notice any improvement.  Although he is tried on different medication he feels only time he had any improvement was when he was on tramadol.  In addition to burning and tingling sensation in his leg he also reports impotence.  There is no history of any excessive constipation and postural hypotension.    Patient was evaluated in our clinic in 2017 with similar symptoms and at that time had an elevated hemoglobin A1c at 7.1, SPEP, antinuclear antibody, SSA/SSB antibody, rheumatoid  factor, Lyme titer were normal.  He was tried on the Loxitane and gabapentin but did not find it very effective.     PROBLEM LIST   Patient Active Problem List   Diagnosis Code     Coronary artery disease involving native coronary artery of native heart without angina pectoris I25.10     Celiac disease K90.0     Type 2 diabetes mellitus with other specified complication, without long-term current use of insulin (H) E11.69     Essential hypertension I10     Hyperlipidemia E78.5     Neurogenic claudication due to lumbar spinal stenosis M48.062     Peripheral arterial disease (H) I73.9         PAST MEDICAL & SURGICAL HISTORY     Past Medical History:   Patient  has no past medical history on file.    Surgical History:  He  has a past surgical history that includes BILATERAL LUMBAR 3-4, LUMBAR 4-5 TRANSFORAMINAL LUMBAR INTERBODY FUSION; CORONARY ANGIOPLASTY WITH STENT PLACEMENT; and hernia repair.     SOCIAL HISTORY     Reviewed, and he  reports that he quit smoking about 18 years ago. His smoking use included cigarettes. He smoked 2.00 packs per day. He has never used smokeless tobacco. He reports current alcohol use.     FAMILY HISTORY     Reviewed, and family history includes Coronary Artery Disease in his father; Diabetes in his mother; Heart Failure in his father; Liver Disease in his mother.     ALLERGIES     Allergies   Allergen Reactions     Macadamia Nut Oil Anaphylaxis     Atorvastatin Muscle Pain (Myalgia)         REVIEW OF SYSTEMS     A 12 point review of system was performed and was negative except as outlined in the history of present illness.     HOME MEDICATIONS     Current Outpatient Rx   Medication Sig Dispense Refill     acetaminophen (TYLENOL) 500 MG tablet Take 1,000 mg by mouth       aspirin (ASA) 81 MG EC tablet Take 81 mg by mouth       atenolol (TENORMIN) 25 MG tablet Take 25 mg by mouth       clopidogrel (PLAVIX) 75 MG tablet Take 75 mg by mouth       gabapentin (NEURONTIN) 300 MG capsule TAKE  "1 CAPSULE BY MOUTH EVERY MORNING AND 1 CAPSULE IN THE AFTERNOON AND 3 CAPSULES AT BEDTIME       losartan (COZAAR) 50 MG tablet Take 50 mg by mouth       metFORMIN (GLUCOPHAGE-XR) 500 MG 24 hr tablet Take 1,000 mg by mouth       multivitamin, therapeutic with minerals (THERA-VIT-M) TABS tablet Take 1 tablet by mouth       nitroGLYcerin (NITROSTAT) 0.4 MG sublingual tablet Place 0.4 mg under the tongue       omeprazole (PRILOSEC) 20 MG DR capsule Take 20 mg by mouth       rosuvastatin (CRESTOR) 20 MG tablet Take 20 mg by mouth       traMADol (ULTRAM) 50 MG tablet Take 1 tablet (50 mg) by mouth every 8 hours as needed for pain 90 tablet 0         PHYSICAL EXAM     Vital signs  BP (!) 177/103 (BP Location: Right arm, Patient Position: Sitting)   Pulse 69   Ht 1.778 m (5' 10\")   Wt 87.5 kg (193 lb)   BMI 27.69 kg/m      Weight:   193 lbs 0 oz    Patient is alert and oriented x4 in no acute distress. Vital signs were reviewed and are documented in electronic medical record. Neck was supple, no carotid bruits, thyromegaly, JVD, or lymphadenopathy was noted.   NEUROLOGY EXAM:    Patient s speech was normal with no aphasia or dysarthria. Mentation, and affect were also normal.     Funduscopic exam was normal, with normal cup to disc ratio. Cranial nerves II -XII were intact.     Patient had normal mass, tone and motor strength was 5/5 in all extremities without pronator drift.     Sensation was decreased to light touch pinprick vibratory and temperature sensation below knees    Reflexes were 2+ in the upper extremity 1+ at knees absent at ankles    No dysmetria noted on FNF or HKS. Romberg was negative.    Gait testing was normal. Able to walk on toes/heels. Tandem walk normal.     DIAGNOSTIC STUDIES     PERTINENT RADIOLOGY  Following imaging studies were reviewed:     MRI LUMBAR SPINE 1/25/2021  1.  Unchanged mild L4-L5 degenerative disc disease with an unchanged small diffuse posterior disc bulge and previous " postoperative changes of her treatment compressive laminectomy.  There is a small diffuse posterior disc bulge contributes to a mild midline spinal canal stenosis with mild narrowing of the right lateral recess, where there is redemonstrated slight contact of the traversing right L5 nerve root  2.  Unchanged moderate midline L3-L4 spinal canal stenosis with slight narrowing of the right lateral recess and mild narrowing of the left L3-L4 lateral recess.  3.  Unchanged moderate bilateral L3-L4 and moderate left L4-L5 neuroforaminal stenosis  4.  Moderate bilateral L3-L4 facet arthropathy with interval development of small facet joint effusion.  This is accompanied by a small amount of adjacent degenerative osseous edema  5.  Additional severe bilateral L4-L5 facet arthropathy with unchanged 2 mm degenerative anterolisthesis of L3 on L4 and L4 and L5  6.  Relatively short pedicles in the lower lumbar spine contribute to a diffuse congenital spinal canal narrowing    MRI LUMBAR SPINE 3/9/2017  Multilevel degenerative lumbar spondylosis, a  developmentally narrowed mid to lower lumbar spinal canal, interval  bilateral decompressive laminectomies at L4-5 and the following  notable findings:  1.  Decreased size of 2 to 3 mm mildly cranially directed L4-5 right  paracentral disc protrusion with mild central stenosis, but no  traversing neural compression.  2.  Unchanged chronic moderate L3-4 and mild L2-3 central stenosis  without traversing neural compression.  3.  No acute fracture, spondylolysis or evidence of arachnoidal  adhesive disease.  4.  Chronic mild right/mild to moderate left L4-5 and mild bilateral  L3-4 foraminal stenosis without ganglionic compression.  5.  Mild bilateral L5-S1, moderate right/mild to moderate left L4-5  and moderate left/mild to moderate right L3-4 facet arthrosis.    AUTONOMIC REFLEX SCREEN 7/6/2017  There is evidence of focal postganglionic sudomotor dysfunction while cardiovagal and  adrenergic functions are preserved. Local factors could be responsible for the result, but an early small fiber neuropathy cannot be excluded.    EMG 7/5/2017  Abnormal study. There is electrophysiologic evidence of a chronic,   inactive, left L5 lumbar radiculopathy. There is no compelling evidence of large fiber peripheral neuropathy.      PERTINENT LABS  Following labs were reviewed:   Ref Range & Units  5/20/2021   SODIUM 135 - 145 mmol/L 139    POTASSIUM 3.5 - 5.0 mmol/L 3.9    CHLORIDE 98 - 110 mmol/L 105    CO2,TOTAL 21 - 31 mmol/L 21    ANION GAP 5 - 18  13    GLUCOSE 65 - 100 mg/dL 199High     CALCIUM 8.5 - 10.5 mg/dL 9.6    BUN 8 - 25 mg/dL 12    CREATININE 0.72 - 1.25 mg/dL 1.02    BUN/CREAT RATIO           10 - 20  12    GFR if African American >60 ml/min/1.73m2 >60    GFR if not African American >60 ml/min/1.73m2 >60                 Total time spent for face to face visit, reviewing labs/imaging studies, counseling and coordination of care was: 1 Hour 15 Minutes spent on the date of the encounter doing chart review, review of outside records, review of test results, interpretation of tests, patient visit, documentation and discussion with family       This note was dictated using voice recognition software.  Any grammatical or context distortions are unintentional and inherent to the software.    Orders Placed This Encounter   Procedures     PANDA w/Reflex ([facility])     Angiotensin converting enzyme     Folate     Hemoglobin A1c     Lyme Disease Ab, Total and IgM, Reflex to WB ([facility])     Methylmalonic Acid     Protein electrophoresis     Protein Immunofixation Serum     Vitamin B1 plasma     Vitamin B12     Vitamin B6 ([facility])     EMG      New Prescriptions    TRAMADOL (ULTRAM) 50 MG TABLET    Take 1 tablet (50 mg) by mouth every 8 hours as needed for pain      Modified Medications    No medications on file

## 2021-06-15 NOTE — PROGRESS NOTES
Care Management Initial Consult    General Information:  Patient's communication limitations: none  Level of Orientation: A & O x 3     Advance Care Planning: Patient has advance directive, copy not in chart   No    Living Environment:   People in home/Living arrangements: Spouse/significant other  Current residence:  Private residence      Family/Social Support:  Care provided by/ Primary Caregiver: Self immediate family(As needed.)  Provides care for someone: No  Description of Support System: Spouse/significant other, Family members     Lifestyle & Psychosocial Needs:        Socioeconomic History     Marital status:      Spouse name: Not on file     Number of children: Not on file     Years of education: Not on file     Highest education level: Not on file     Tobacco Use     Smoking status: Former Smoker     Packs/day: 2.00     Types: Cigarettes     Quit date: 2003     Years since quittin.1     Smokeless tobacco: Never Used   Substance and Sexual Activity     Alcohol use: Yes     Comment: 6 beer per week     Drug use: No       Functional Status:  Prior to admission ADL limits: No      Current Resources:   Skilled Home Care Services:    Community Resources: None  Equipment currently used at home: walker, standard, grab bar, tub/shower  Supplies currently used at home: None    Employment:  Employment Status:  No No    Financial/Environmental Concerns/Barriers to Discharge: (No current barriers to d/c identified at this time.)      Values/Beliefs:  Spiritual, Cultural Beliefs, Sabianist Practices, Values that affect care: no              Additional Information:  CM met with pt to introduce Care Management services and obtain a discharge assessment. Pt reports total independence at baseline and no need for any CM services at discharge. Wife to transport pt home at discharge without issue.    CM to follow.    Melquiades Aldana RN

## 2021-06-15 NOTE — PROGRESS NOTES
[facility] Daily Progress Note    Assessment/Plan:  POD#0 - S/p Transforaminal Lumbar Interbody Fusion  # Back Pain / Radiculopathy  # History of spondylolisthesis L4-L5  - Postop care / pain management, DVT prophylaxis per surgery     # HTN  - Continue PTA atenolol with hold parameters     # HLD / CAD  # History of MI s/p stent x1 (12/2004)  No cardiac issues since 2004.  - Continue PTA statin  - Hold ASA for now; resume per surgery recs  - Atenolol as above     # T2DM  Diet / exercise controlled only. Last A1C 7.2% at time of preop H&P.  Glucose elevated s/p decadron intraoperatively.  resume aspirin when spine surgery agreeable.  - Sliding scale insulin, glucose checks 4x daily  Would like  To continue with diet and exercise for now.   Risks and benefits of hyperglycemia d/w patient.      # GERD  Gets heartburn from statin.  - Continue PTA omeprazole    Principal Problem:    Neurogenic claudication due to lumbar spinal stenosis  Active Problems:    Coronary artery disease involving native coronary artery of native heart without angina pectoris    Essential hypertension    Type 2 diabetes mellitus with other specified complication, without long-term current use of insulin (H)     LOS: 2 days     Subjective:  Doing well denies any complaints.  No shortness of breath chest pain.  Hoping to discharge today.  He was informed of the high blood sugars which might have been related to dexamethasone that he received Intra-Op.  He was not very compliant with diabetic diet recently and has not been able to do exercise because of the back pain.  Now he is hoping to do more after his surgery.  He would like to hold off on starting any antidiabetic medication at this time.  Risks of hyperglycemia including impaired healing discussed with the patient.  He verbalized understanding.      acetaminophen  500 mg Oral Q4H     atenoloL  25 mg Oral DAILY     bisacodyL  10 mg Rectal Once     insulin aspart (NovoLOG) injection   Subcutaneous  TID with meals     insulin aspart (NovoLOG) injection   Subcutaneous QHS     multivitamin therapeutic  1 tablet Oral DAILY     omeprazole  20 mg Oral QAM AC     polyethylene glycol  17 g Oral BID     rosuvastatin  20 mg Oral QHS     senna-docusate  1 tablet Oral BID     sodium chloride  3 mL Intravenous Line Care     [START ON 3/4/2021] sodium phosphates 133 mL  1 enema Rectal Once       Objective:  Vital signs in last 24 hours:  Temp:  [97.3  F (36.3  C)-97.8  F (36.6  C)] 97.8  F (36.6  C)  Heart Rate:  [67-82] 71  Resp:  [16-20] 16  BP: (121-156)/(60-72) 136/67  Weight:   187 lb 3.2 oz (84.9 kg)    Intake/Output last 3 shifts:  I/O last 3 completed shifts:  In: -   Out: 2650 [Urine:2300; Drains:350]  Intake/Output this shift:  No intake/output data recorded.    Review of Systems:   As per subjective, all others negative.    Physical Exam:    General Appearance:  Alert, cooperative, no distress, appears stated age   Head:  Normocephalic, without obvious abnormality, atraumatic   Eyes:  PERRL,    Neck: Supple   Back:    Status post lumbar fusion   Lungs:   Clear to auscultation bilaterally, respirations unlabored   Chest Wall:  No tenderness or deformity   Heart:  Regular rate and rhythm, S1, S2 normal,no murmur, rub or gallop   Abdomen:   Soft, non tender, non distended, bowel sounds present, no guarding or rigidity   Extremities: No edema    Skin: Skin color, texture, turgor normal, no rashes or lesions   Neurologic: Alert and oriented X 3, Moves all 4 extremities       Lab Results:  Personally Reviewed.   Recent Results (from the past 24 hour(s))   POCT Glucose    Collection Time: 03/02/21 11:53 AM    Specimen: Blood   Result Value Ref Range    Glucose 240 (H) 70 - 139 mg/dL   POCT Glucose    Collection Time: 03/02/21  5:48 PM    Specimen: Blood   Result Value Ref Range    Glucose 209 (H) 70 - 139 mg/dL   POCT Glucose    Collection Time: 03/02/21  9:17 PM    Specimen: Blood   Result Value Ref Range    Glucose  307 (H) 70 - 139 mg/dL   POCT Glucose    Collection Time: 03/03/21  6:06 AM    Specimen: Blood   Result Value Ref Range    Glucose 141 (H) 70 - 139 mg/dL         Ayanna Comer M.D  [facility] Service  Internal Medicine

## 2021-06-15 NOTE — PLAN OF CARE
Problem: Pain  Goal: Patient's pain/discomfort is manageable  Outcome: Progressing     Problem: Daily Care  Goal: Daily care needs are met  Outcome: Progressing     Patient vital signs are at baseline: Yes  Patient able to ambulate as they were prior to admission or with assist devices provided by therapies during their stay:  Yes  Patient MUST void prior to discharge:  Yes  Patient able to tolerate oral intake:  Yes  Pain has adequate pain control using Oral analgesics:  Yes    Pt states pain is 5/10, declines prn pain medication at this time. Vitals stable. Ambulates to bathroom with standby assist, tolerates well. Hemovac with 70 ml output. Dressing clean and dry.

## 2021-06-15 NOTE — PROGRESS NOTES
Care Management Discharge Note    Discharge Planning:  Expected Discharge Date: 03/04/21  Concerns to be Addressed / Barriers to Discharge: (No current barriers to d/c identified at this time.)      Anticipated Discharge Disposition:  Home or Self Care     Anticipated Discharge Services:  None    Anticipated Discharge DME:        Patient/family educated on Medicare website which has current facility and service quality ratings:      Referrals Placed by CM/SW:         Selected Continued Care - Admitted Since 3/1/2021    No services have been selected for the patient.         Education Provided on the Discharge Plan: Discuss d/c      Patient/Family in Agreement with the Plan: Yes   Disposition Comments: Spouse/significant other, Family members  Assessed. Pt is from home with wife independently. Denies CM needs. Anticipate return home via wife at d/c without issue or services.      CM spoke with pt today and he confirmed above assessment info.    No CM needs expressed. Pt will transfer to home via wife at discharge.    Melquiades Aldana RN

## 2021-06-15 NOTE — PROGRESS NOTES
Occupational Therapy     03/02/21 1045   Visit Specifics   Eval Type Inital eval   Inital OT Consult  03/02/21   Bed/Tabs/Pad Alarm Applied Not applicable   Treatment Time   ADL Training 23   General   Onset date 03/01/21   Chart Reviewed Yes   PT/OT Patient/Caregiver Stated Goals go home today if I can   Family/Caregiver Present No   Precautions   Weight Bearing Status wbat   General Precautions Spinal   Home Living   Type of Home House   Bathroom Shower/Tub Walk-in shower   Bathroom Toilet Raised   Bathroom Equipment Grab bars around toilet   Prior Status   Independent With All ADL's/IADL's   Lives With Spouse   Receives Help From Family   Vocational Full time employment  (facilities at [employer])   ADL   Grooming All grooming   All Grooming Modified independent (Device);Standing   Upper Body Dressing Pull over   Pull Over Modified independent (Device);Cues for technique;Cues for safety   Lower Body Dressing Pants;Underware;Socks;Shoes   Pants Modified independent (Device);After OT interventions;Cues for technique;Cues for safety   Underware Modified independent (Device);After OT interventions;Cues for technique;Cues for safety   Socks Modified independent (Device);After OT interventions;Cues for technique;Cues for safety   Shoes Modified independent (Device);After OT interventions;Cues for technique;Cues for safety   Toileting All toileting   All Toileting Modified independent (Device);Cues for technique;Cues for safety   Kitchen/Functional Mobility Independent;Follows precautions;Walker   Activity Tolerance   Endurance Endurance does not limit participation in activity   Balance   Sitting Balance Independent   Standing Balance Independent   Bed Mobility   Bed Mobility Rolling;Supine Scoot   Rolling Moderately independent (Device);Log roll   Supine to Sit Modified Independent;Log roll   Sit to Supine Modified Independent;Log roll   Supine Scoot Modified independent (Device)   Functional Transfers   Functional  Transfers Bed Transfers;Chair Transfers;Toilet Transfers;Car Transfers   Bed Transfers Modified independent   Chair Transfers Modified independent   Toilet Transfers Modified independent   Car Transfers Modified independent   Transfer Cues Technique;Correct hand placement;Safety;Used correct ortho technique;After OT intervention;RTS;Grab bars   Transfer Deficits Increased effort;Increased time;Decreased flexibility   Ambulation   Location To bathroom;To chair;In room   Assistance Modified independent   Device Rolling Walker   Verbal Cues Safety   Weight Bearing Status WBAT   Cognition   Overall Cognitive Status WFL   RUE Assessment   RUE Assessment WFL   LUE Assessment   LUE Assessment WFL   Assessment   Assessment Decreased ADL status;Decreased funtional mobility   Prognosis Good   Treatment/Interventions ADL training;Functional transfer training   OT Frequency Daily   Goal Formulation Patient;Family unavailable   Recommendations   OT Discharge Recommendation Home with family support/assistance   OT Summary dc OT-goals met   OT Care Plan REVIEWED DAILY Yes, goals remain appropriate   Handouts Given Precautions after Spine/Back Surgery;Body Mechanics after Spine/Back Surgery

## 2021-06-15 NOTE — OP NOTE
DATE OF SERVICE: 3-1-21      PREOPERATIVE DIAGNOSES:    1.  L4-5 spondylolisthesis from previous laminectomy years ago with dynamic stenosis  2.  L3-4 degenerative spondylolisthesis with central and bilateral subarticular recess stenosis and neurogenic claudication    POSTOPERATIVE DIAGNOSES:  same      PROCEDURES:  1. Left sided decompression and transforaminal/transfacet decompression of the exiting L4 and traversing L5 nerve root.  2.  Transforaminal lumbar interbody fusion L4-5 with autograft bone and a Medtronic Elevate cage 8-12 mm height,  28 mm in length.  3.  Left sided transforaminal/transfacet decompression of the exiting L3 and traversing L4 nerve roots.  4.  Transforaminal lumbar interbody fusion L3-4 with autograft bone and a Medtronic Elevate cage 8-12 mm height, 28 mm in length.  5.  Right-sided posterolateral fusion L4-5 and L3-4.  6.  Pedicle screw fixation placed in the pedicles of L3  L4  and L5 bilaterally.  7.  Revision laminectomy L4-5 with removal of the remaining lamina and decompression of the thecal sac and the traversing nerves  8.  Right-sided laminectomy L3-4 with decompression of the thecal sac on the right side and the traversing nerve roots    ESTIMATED BLOOD LOSS:  200 mL.    DRAINS: 1 deep Hemovac given the revision nature of the surgery    COMPLICATIONS:  None perceived.    SPECIMENS SENT:  None.    HISTORY OF PRESENT ILLNESS AND INDICATIONS FOR PROCEDURE:  This is a 65 y.o. year old patient who came to see me with low back and buttock pain radiating to the legs..   He also had a degenerative spondylolisthesis at L3-4 and L4-5 with significant stenosis most notably at the L3-4 level.  The L4-5 level had a previous laminectomy and had quite a bit of motion on flexion-extension x-rays.  We discussed options of continued nonoperative management, epidural steroid injections or surgical intervention.  Obviously, given his stenosis and symptoms surgical intervention was indicated.  We  discussed the risks of surgery including but not limited to bleeding, infection, nerve damage, failure the procedure to relieve symptoms, iatrogenic instability, dural laceration requiring repair and flat bed rest, DVT, PE, blindness and even death.      Therefore, the patient was seen in the preop area of [facility] today.  Low back was marked and the consent was again reverified.  The patient was brought to the operating room, intubated via general endotracheal anesthetic and  placed on a Miguel Angel table with a Yovani frame with care taken to pad all bony  prominences.  Pause for the Cause performed correctly identifying the patient,  procedure, proposed plan and radiographs and all were in agreement.  He  was prepped and draped in the standard fashion for a lumbar spine procedure.  We localized our incision and dissected down over the L3 and L4 hemilamina bilaterally.  We localized our level on lateral fluoroscopy.  On the right we scored  the L4 pars interarticularis and removed the descending articular process of L4  along with the ascending articular process of 5.  They were significantly compressive but we were able to fully decompress it and followed that L5  nerve all the way out.  We were able to maneuver around the nerve and very carefully perform a box annulotomy at L4-5.  We removed the disk material with a combination of ODC curettes, pituitary rongeurs and kerrison rongeurs then placed autograft bone along with a cage.  This had a very nice fit.  We took great care to  protect the nerve.  We did perform a complete revision laminectomy at the L4-5 level to make certain the thecal sac was well decompressed.      We then traveled to the L3-4 level on the right and scored the pars interarticularis of L3.  We removed the descending articular process of L3 flush with the pedicle and the ascending articular process of L4.  We were very careful not to crack the L4 pedicle.  We fully decompressed that L3  exiting nerve root and the traversing L4 nerve root.  When we were finished there was no compression of either of those nerve roots whatsoever.   We removed the disk material with a combination of ODC curettes, pituitary rongeurs and kerrison rongeurs then placed autograft bone along with a cage.  This had a very nice fit.  We took great care to protect the nerve.    We performed a full laminectomy at L3-4 with complete decompression of the thecal sac.      We then turned our attention to placing our pedicle screws.  We first used a  Midas Travon bur then a pedicle probe to cannulate the pedicle.  We then placed a Alva  probe and made certain that there were no breaches.  We then placed our screws.  All were 6.5 x45.  We placed two 70 mm rods.    We decorticated the facet joints on the left and the transverse processes on the left side for our spinal fusion at L3-4 and L4-5.  When we were finished there was no continued compression of those nerves.  Instrumentation was in good position.  We did place our remaining autograft bone in the posterolateral gutter at L3-4 and L4-5.   We then irrigated the wound, placed vancomycin.  All  instrumentation was tightened to 's specifications.  We then closed the  deep fascia with #1 Vicryl, intermediate tissue with a #1 Vicryl, subcutaneous tissue with 2-0 Vicryl and skin with 3-0 Vicryl.  Sterile dressing was applied.  Patient tolerated  procedure well.  There were no apparent complications.  He will be weightbearing as tolerated bilateral lower extremities.  Strict instructions to avoid bending, lifting and twisting over the next 6 weeks.  Early mobilization and TATIANNA  stockings for his DVT prophylaxis and have 2 grams of Ancef IV q.8 hours x2 doses for antibiotics.  Return to see me in 6 weeks, sooner if there are any problems,  questions or concerns.     Instrumentation used during this case [employer] [facility]SR[facility] 3DX 6.5 x 45

## 2021-06-15 NOTE — ANESTHESIA POSTPROCEDURE EVALUATION
Patient: Hardeep Silvestre  Procedure(s):  BILATERAL LUMBAR 3-4, LUMBAR 4-5 TRANSFORAMINAL LUMBAR INTERBODY FUSION  Anesthesia type: general    Patient location: PACU  Last vitals:   Vitals Value Taken Time   /72 03/01/21 1253   Temp 36.6  C (97.9  F) 03/01/21 1253   Pulse 68 03/01/21 1253   Resp 18 03/01/21 1253   SpO2 92 % 03/01/21 1319     Post vital signs: stable  Level of consciousness: awake and responds to simple questions  Post-anesthesia pain: pain controlled  Post-anesthesia nausea and vomiting: no  Pulmonary: unassisted, return to baseline  Cardiovascular: stable and blood pressure at baseline  Hydration: adequate  Anesthetic events: no    QCDR Measures:  ASA# 11 - Daphnie-op Cardiac Arrest: ASA11B - Patient did NOT experience unanticipated cardiac arrest  ASA# 12 - Daphnie-op Mortality Rate: ASA12B - Patient did NOT die  ASA# 13 - PACU Re-Intubation Rate: ASA13B - Patient did NOT require a new airway mgmt  ASA# 10 - Composite Anes Safety: ASA10A - No serious adverse event    Additional Notes:

## 2021-06-15 NOTE — DISCHARGE SUMMARY
ORTHOPEDIC DISCHARGE SUMMARY       Hardeep Silvestre,  1955, MRN [id number]    Admission Date: 3/1/2021  Admission Diagnoses: Lumbar foraminal stenosis [M48.061]  Claudication (H) [I73.9]  Spondylolisthesis [M43.10]     Discharge Date: 3/4/2021     Post-operative Day:  3 Days Post-Op    Reason for Admission: The patient was admitted for the following:  BILATERAL LUMBAR 3-4, LUMBAR 4-5 TRANSFORAMINAL LUMBAR INTERBODY FUSION    BRIEF HOSPITAL COURSE   This 65 y.o. male underwent the aforementioned procedure with Dr. Kapoor on 3/1/2021. There were no intraoperative complications and the patient was transferred to the recovery room and later the orthopedic unit in stable condition. Once the patient reached the orthopedic floor our orthopedic pain protocol was implemented along with the following:    Anticoagulation Medications: none  Therapy: Physical Therapy and Occupational Therapy  Activity: WBAT  Bracing: none    Consultations during Admission: Hospitalist service for medical management    COMPLICATIONS/SIGNIFICANT FINDINGS    none    DISCHARGE INFORMATION   Condition at discharge: good  Discharge destination: Home or Self Care  Patient was seen by myself on the date of discharge.    FOLLOW UP CARE   Follow up with orthopedics in 6 weeks or sooner should the need arise. Ortho will continue to manage pain control, post op anticoagulation and incision care.     Follow up with your PCP in 6-10 days (based on your readmission risk score) for management of chronic medical problems and to evaluate for post op medical complications including constipation, nausea/vomiting, DVT/PE, anemia, changes in blood pressure, fevers/chills, urinary retention and atelectasis/pneumonia.     Subjective   Lavell states he is doing well. Drain was pulled this AM. Denies headache, increased leg pain, paresthesias, weakness.     Physical Exam   The patient is A&Ox3.  Sensation is intact.  BLE strength 5/5.  Dorsalis pedis pulse  intact.  Calves are soft and non-tender.   The incision is covered. Dressing C/D/I      Vitals:    03/04/21 0752   BP: 137/81   Pulse: 85   Resp: 12   Temp: 97.4  F (36.3  C)   SpO2: 93%       Pertinent Results at Discharge     Hemoglobin   Date/Time Value Ref Range Status   03/04/2021 08:13 AM 12.6 (L) 14.0 - 18.0 g/dL Final   03/01/2021 07:16 AM 14.0 14.0 - 18.0 g/dL Final   12/10/2018 09:04 AM 15.1 14.0 - 18.0 g/dL Final     Platelets   Date/Time Value Ref Range Status   12/10/2018 09:04  140 - 440 thou/uL Final   06/16/2016 11:01  140 - 440 thou/uL Final   06/17/2014 07:45  140 - 440 thou/uL Final       Medications at Discharge      Hardeep Silvestre   Home Medication Instructions MARY ANN:[id number]    Printed on:03/04/21 1250   Medication Information                      acetaminophen (TYLENOL) 500 MG tablet  Take 2 tablets (1,000 mg total) by mouth 3 (three) times a day.             aspirin 81 MG EC tablet  Take 1 tablet (81 mg total) by mouth daily. May resume 5 days after spine surgery on Saturday 3/6/2021             atenolol (TENORMIN) 25 MG tablet  Take 25 mg by mouth daily.              fluticasone (CUTIVATE) 0.05 % cream  Apply 1 application topically daily as needed. 3 days per week as needed             hydrOXYzine HCL (ATARAX) 25 MG tablet  Take 1 tablet (25 mg total) by mouth every 6 (six) hours as needed for anxiety (adjuvant pain).             multivitamin with minerals (THERA-M) 9 mg iron-400 mcg Tab tablet  Take 1 tablet by mouth daily.             nitroglycerin (NITROSTAT) 0.4 MG SL tablet  Place 0.4 mg under the tongue every 5 (five) minutes as needed for chest pain.             omeprazole (PRILOSEC) 20 MG capsule  Take 20 mg by mouth Daily before breakfast.             oxyCODONE (ROXICODONE) 5 MG immediate release tablet  Take 1-2 tablets (5-10 mg total) by mouth every 4 (four) hours as needed.             polyethylene glycol (MIRALAX) 17 gram packet  Take 1 packet (17 g total) by  mouth 2 (two) times a day.             rosuvastatin (CRESTOR) 20 MG tablet  Take 20 mg by mouth at bedtime.             senna-docusate (PERICOLACE) 8.6-50 mg tablet  Take 1 tablet by mouth 2 (two) times a day.                 Problem List   Principal Problem:    Neurogenic claudication due to lumbar spinal stenosis  Active Problems:    Coronary artery disease involving native coronary artery of native heart without angina pectoris    Essential hypertension    Type 2 diabetes mellitus with other specified complication, without long-term current use of insulin (H)        Erick Freeman PA-C  Date: 3/4/2021  Time: 12:52 PM

## 2021-06-15 NOTE — PLAN OF CARE
Problem: Pain  Goal: Patient's pain/discomfort is manageable  Outcome: Progressing     Problem: Daily Care  Goal: Daily care needs are met  Outcome: Progressing     VSS. Patient has BLE neuropathy. Pain is managed with scheduled tylenol, PRN oxycodone and hydroxyzine. Dressing was changed this afternoon. Patient had 100 mL from Hemovac, drain remains in place. Tolerating diabetic diet.  Independent in the room.    Patient vital signs are at baseline: Yes  Patient able to ambulate as they were prior to admission or with assist devices provided by therapies during their stay:  Yes  Patient MUST void prior to discharge:  Yes  Patient able to tolerate oral intake:  Yes  Pain has adequate pain control using Oral analgesics:  Yes

## 2021-06-15 NOTE — ANESTHESIA POSTPROCEDURE EVALUATION
Patient: Hardeep Silvestre  Procedure(s):  BILATERAL LUMBAR 3-4, LUMBAR 4-5 TRANSFORAMINAL LUMBAR INTERBODY FUSION  Anesthesia type: general    Patient location: PACU  Last vitals:   Vitals Value Taken Time   /60 03/02/21 1230   Temp 36.6  C (97.8  F) 03/02/21 1230   Pulse 67 03/02/21 1230   Resp 20 03/02/21 1230   SpO2 95 % 03/02/21 1230     Post vital signs: stable  Level of consciousness: awake and responds to simple questions  Post-anesthesia pain: pain controlled  Post-anesthesia nausea and vomiting: no  Pulmonary: unassisted, return to baseline  Cardiovascular: stable and blood pressure at baseline  Hydration: adequate  Anesthetic events: no    QCDR Measures:  ASA# 11 - Daphnie-op Cardiac Arrest: ASA11B - Patient did NOT experience unanticipated cardiac arrest  ASA# 12 - Daphnie-op Mortality Rate: ASA12B - Patient did NOT die  ASA# 13 - PACU Re-Intubation Rate: ASA13B - Patient did NOT require a new airway mgmt  ASA# 10 - Composite Anes Safety: ASA10A - No serious adverse event    Additional Notes:

## 2021-06-15 NOTE — PROGRESS NOTES
Orthopedic Progress Note      Assessment: 2 Days Post-Op  S/P BILATERAL LUMBAR 3-4, LUMBAR 4-5 TRANSFORAMINAL LUMBAR INTERBODY FUSION    Plan:   - Continue PT/OT  - Weightbearing status: WBAT  - Anticoagulation: SCDs, maycol stockings and early ambulation.  - Discharge planning: Home likely tomorrow      Subjective:  Pain: mild  Nausea, Vomiting:  No  Lightheadedness, Dizziness:  No  Neuro:  Patient denies new onset numbness or paresthesias    Patient states he is doing well. He has passed therapies. Pain is controlled. Denies increased leg pain, paresthesias, weakness.     Objective:  Vitals:    03/03/21 0812   BP: 112/59   Pulse: 83   Resp: 18   Temp: 97.3  F (36.3  C)   SpO2: 95%     The patient is A&Ox3. Appears comfortable.   Sensation is intact.  BLE strength 5/5.   Dorsalis pedis pulse intact.  Calves are soft and non-tender. Negative Charleen's.  The incision is covered. Dressing C/D/I.    Drain output 110 overnight.     Pertinent Labs   Lab Results: personally reviewed.   No results found for: INR, PROTIME  Lab Results   Component Value Date    WBC 6.4 12/10/2018    HGB 14.0 03/01/2021    HCT 45.3 12/10/2018    MCV 95 12/10/2018     12/10/2018     Lab Results   Component Value Date     11/10/2020    K 5.2 (H) 11/10/2020     11/10/2020    CO2 25 11/10/2020         Report completed by:  Erick Freeman PA-C  Date: 3/3/2021  Time: 10:55 AM

## 2021-06-15 NOTE — PLAN OF CARE
Problem: Occupational Therapy  Goal: OT Goals  Description: Patient will demonstrate the following by 3/2/2021, in order to maximize independence with ADL/IADL performance:     -Patient will be mod I with dressing him/herself with AE as needed.    -Patient will be mod i with bed, chair, toilet, car, tub/shower, and kitchen mobility.    Goals entered on 3/2/2021 by Dora Butler OT      Outcome: Completed   Occupational Therapy Discharge Summary    Date of OT Discharge: 3/2/2021  Refer to daily doc flowsheet for equipment issued and current functional status.  Discharge Destination: Home  Discharge Comments: Pt met his OT goals this AM      3/2/2021 by Dora Butler, OT

## 2021-06-15 NOTE — ANESTHESIA CARE TRANSFER NOTE
Last vitals:   Vitals:    03/01/21 0955   BP: 175/95   Pulse: 63   Resp: 12   Temp: 36.4  C (97.5  F)   SpO2: 99%     Patient's level of consciousness is awake  Spontaneous respirations: yes  Maintains airway independently: yes  Dentition unchanged: yes  Oropharynx: oropharynx clear of all foreign objects    QCDR Measures:  ASA# 20 - Surgical Safety Checklist: WHO surgical safety checklist completed prior to induction    PQRS# 430 - Adult PONV Prevention: 4558F - Pt received => 2 anti-emetic agents (different classes) preop & intraop  ASA# 8 - Peds PONV Prevention: NA - Not pediatric patient, not GA or 2 or more risk factors NOT present  PQRS# 424 - Daphnie-op Temp Management: 4559F - At least one body temp DOCUMENTED => 35.5C or 95.9F within required timeframe  PQRS# 426 - PACU Transfer Protocol: - Transfer of care checklist used  ASA# 14 - Acute Post-op Pain: ASA14B - Patient did NOT experience pain >= 7 out of 10

## 2021-06-15 NOTE — PROGRESS NOTES
[facility] Daily Progress Note    Assessment/Plan:  POD#0 - S/p Transforaminal Lumbar Interbody Fusion  # Back Pain / Radiculopathy  # History of spondylolisthesis L4-L5  - Postop care / pain management, DVT prophylaxis per surgery     # HTN  - Continue PTA atenolol with hold parameters     # HLD / CAD  # History of MI s/p stent x1 (12/2004)  No cardiac issues since 2004.  - Continue PTA statin  - Hold ASA for now; resume per surgery recs  - Atenolol as above     # T2DM  Diet / exercise controlled only. Last A1C 7.2% at time of preop H&P.  Glucose elevated to 249 s/p decadron intraoperatively.  resume aspirin when spine surgery agreeable.  - Sliding scale insulin, glucose checks 4x daily     # GERD  Gets heartburn from statin.  - Continue PTA omeprazole    Principal Problem:    Neurogenic claudication due to lumbar spinal stenosis  Active Problems:    Coronary artery disease involving native coronary artery of native heart without angina pectoris    Essential hypertension    Type 2 diabetes mellitus with other specified complication, without long-term current use of insulin (H)     LOS: 1 day     Subjective:  Doing well denies any complaints.  No shortness of breath chest pain.  Back pain is controlled.    acetaminophen  500 mg Oral Q4H     atenoloL  25 mg Oral DAILY     [START ON 3/3/2021] bisacodyL  10 mg Rectal Once     insulin aspart (NovoLOG) injection   Subcutaneous TID with meals     insulin aspart (NovoLOG) injection   Subcutaneous QHS     multivitamin therapeutic  1 tablet Oral DAILY     omeprazole  20 mg Oral QAM AC     polyethylene glycol  17 g Oral BID     rosuvastatin  20 mg Oral QHS     senna-docusate  1 tablet Oral BID     sodium chloride  3 mL Intravenous Line Care     [START ON 3/4/2021] sodium phosphates 133 mL  1 enema Rectal Once       Objective:  Vital signs in last 24 hours:  Temp:  [97.3  F (36.3  C)-98  F (36.7  C)] 97.6  F (36.4  C)  Heart Rate:  [76-82] 82  Resp:  [16-20] 20  BP: (104-142)/(56-76)  142/69  Weight:   187 lb 3.2 oz (84.9 kg)    Intake/Output last 3 shifts:  I/O last 3 completed shifts:  In: 3493 [P.O.:760; I.V.:2688; IV Piggyback:25]  Out: 5390 [Urine:4900; Drains:290; Blood:200]  Intake/Output this shift:  I/O this shift:  In: -   Out: 1400 [Urine:1400]    Review of Systems:   As per subjective, all others negative.    Physical Exam:    General Appearance:  Alert, cooperative, no distress, appears stated age   Head:  Normocephalic, without obvious abnormality, atraumatic   Eyes:  PERRL,    Neck: Supple   Back:    Status post lumbar fusion   Lungs:   Clear to auscultation bilaterally, respirations unlabored   Chest Wall:  No tenderness or deformity   Heart:  Regular rate and rhythm, S1, S2 normal,no murmur, rub or gallop   Abdomen:   Soft, non tender, non distended, bowel sounds present, no guarding or rigidity   Extremities: No edema    Skin: Skin color, texture, turgor normal, no rashes or lesions   Neurologic: Alert and oriented X 3, Moves all 4 extremities       Lab Results:  Personally Reviewed.   Recent Results (from the past 24 hour(s))   POCT Glucose    Collection Time: 03/01/21  2:07 PM    Specimen: Capillary; Blood   Result Value Ref Range    Glucose 231 (H) 70 - 139 mg/dL   POCT Glucose    Collection Time: 03/01/21  6:16 PM    Specimen: Blood   Result Value Ref Range    Glucose 288 (H) 70 - 139 mg/dL   POCT Glucose    Collection Time: 03/01/21  8:40 PM    Specimen: Blood   Result Value Ref Range    Glucose 291 (H) 70 - 139 mg/dL   POCT Glucose    Collection Time: 03/02/21  6:09 AM    Specimen: Blood   Result Value Ref Range    Glucose 213 (H) 70 - 139 mg/dL   POCT Glucose    Collection Time: 03/02/21 11:53 AM    Specimen: Blood   Result Value Ref Range    Glucose 240 (H) 70 - 139 mg/dL         Ayanna Comer M.D  [facility]  Internal Medicine

## 2021-06-15 NOTE — PLAN OF CARE
Physical Therapy Discharge Summary    Date of PT Discharge: 3/2/2021  Recommended Equipment: FWW  Discharge Destination: Home with assist; OP PT  Discharge Comments: Discharge PT due to goals met.      3/2/2021 by Abbi Reagan PT    Problem: Physical Therapy  Goal: PT Goals  Description: Patient will demonstrate the following by 3/3/21, in order to maximize independence with functional mobility to facilitate safe discharge:   -Supine<>sit with head of bed flat, no rail, Mod I, with proper log roll technique  -Sit<>stand with FWW assistive device, Mod I  -Ambulate 150 feet with FWW assistive device, Mod I   -Negotiate 12 stairs with 2 rails, SBA, in order to access home.  -Pt will demonstrate and verbalize understanding of spinal precautions.  -Pt will demonstrate understanding and independence of post-surgical back exercises with handout provided.    Goals entered on 3/1/2021 by Nik Henderson, PT      Outcome: Completed

## 2021-06-15 NOTE — PLAN OF CARE
Patient vital signs are at baseline: Yes  Patient able to ambulate as they were prior to admission or with assist devices provided by therapies during their stay:  Yes  Patient MUST void prior to discharge:  No,  Reason: Monge catheter post op, plan to dicontinue 3/2 AM once orders are obtained.  Patient able to tolerate oral intake:  Yes  Pain has adequate pain control using Oral analgesics:  Yes.  Pain control adequate thus far with PRN oxycodone and hydroxyzine.   Ice applied.  POC discussed, questions answered.

## 2021-06-15 NOTE — PLAN OF CARE
Problem: Pain  Goal: Patient's pain/discomfort is manageable  Outcome: Progressing     Problem: Daily Care  Goal: Daily care needs are met  Outcome: Progressing     VSS. Reports baseline BLE neuropathy. Pain is well controlled with scheduled tylenol. Dressing CDI. Hemovac drain remains in place. 140 mL emptied from drain this shift. 2 of 3 PVR scans completed since lang removal this morning. Ambulates with SBA and walker.     Patient vital signs are at baseline: Yes  Patient able to ambulate as they were prior to admission or with assist devices provided by therapies during their stay:  Yes  Patient MUST void prior to discharge:  Yes  Patient able to tolerate oral intake:  Yes  Pain has adequate pain control using Oral analgesics:  Yes

## 2021-06-15 NOTE — ANESTHESIA PREPROCEDURE EVALUATION
Anesthesia Evaluation      Patient summary reviewed   No history of anesthetic complications     Airway   Mallampati: II  Neck ROM: full   Pulmonary - negative ROS and normal exam                          Cardiovascular - normal exam   Neuro/Psych - negative ROS     Endo/Other - negative ROS      GI/Hepatic/Renal - negative ROS           Dental                         Anesthesia Plan  Planned anesthetic: general endotracheal    ASA 3   Induction: intravenous   Anesthetic plan and risks discussed with: patient

## 2021-06-15 NOTE — PROGRESS NOTES
Orthopedic Progress Note      Assessment: 1 Day Post-Op  S/P BILATERAL LUMBAR 3-4, LUMBAR 4-5 TRANSFORAMINAL LUMBAR INTERBODY FUSION    Plan:   - Continue PT/OT  - Weightbearing status: WBAT  - Anticoagulation:SCDs, maycol stockings and early ambulation.  - Discharge planning: Home 1-2 days pending drain output, pain control       Subjective:  Pain: mild  Nausea, Vomiting:  No  Lightheadedness, Dizziness:  No  Neuro:  Patient denies new onset numbness or paresthesias    Patient states he is doing well. Endorses mostly back pain. Leg pain is improved since surgery. Denies headache, chest pain, shortness of breath, LE paresthesias/weakness.     Objective:  Vitals:    03/02/21 0817   BP: 142/69   Pulse: 82   Resp: 20   Temp: 97.6  F (36.4  C)   SpO2: 92%     The patient is A&Ox3. Appears comfortable.   Sensation is intact.  BLE strength 5/5  Dorsalis pedis pulse intact.  Calves are soft and non-tender. Negative Charleen's.  The incision is covered. Dressing C/D/I.    Drain output 70 overnight     Pertinent Labs   Lab Results: personally reviewed.   No results found for: INR, PROTIME  Lab Results   Component Value Date    WBC 6.4 12/10/2018    HGB 14.0 03/01/2021    HCT 45.3 12/10/2018    MCV 95 12/10/2018     12/10/2018     Lab Results   Component Value Date     11/10/2020    K 5.2 (H) 11/10/2020     11/10/2020    CO2 25 11/10/2020         Report completed by:  Erick Freeman PA-C  Date: 3/2/2021  Time: 1:00 PM

## 2021-06-15 NOTE — PLAN OF CARE
Patient vital signs are at baseline: Yes  Patient able to ambulate as they were prior to admission or with assist devices provided by therapies during their stay:  Yes  Patient MUST void prior to discharge:  No,  Reason: lang in place  Patient able to tolerate oral intake:  Yes  Pain has adequate pain control using Oral analgesics:  Yes    Pt pain controlled with PRN Oxycodone and Atarax and scheduled Tylenol. Pt has baseline numbness bilaterally, VSS. Sticky note left for care team regarding lang catheter removal order clarification. Lang still in place this AM.

## 2021-06-15 NOTE — PLAN OF CARE
Problem: Pain  Goal: Patient's pain/discomfort is manageable  Outcome: Progressing     Problem: Daily Care  Goal: Daily care needs are met  Outcome: Progressing    Pt pain controlled with PRN Oxycodone and Hydroxyzine. Tolerating PO and denies any N/V. Drain removed this AM, and tolerated well. Anticipate discharge home today.    Patient vital signs are at baseline: Yes  Patient able to ambulate as they were prior to admission or with assist devices provided by therapies during their stay:  Yes  Patient MUST void prior to discharge:  Yes  Patient able to tolerate oral intake:  Yes  Pain has adequate pain control using Oral analgesics:  Yes

## 2021-06-15 NOTE — PROGRESS NOTES
Discharged to home with spouse. Stated understanding of discharge instructions. Checked room for belongings. Stated that she has her belongings. 3/4/2021 Elizabeth Valadez RN

## 2021-06-15 NOTE — PLAN OF CARE
Pt is alert, pleasant & able to make needs known.  His output from his hemovac drain continues to be above the desired threshold for removal of the drain. (30 ml per 8 hours) He had 100 ml output this shift.  Pt does have increased pain with activity and toward the end of the expected lifespan of his pain meds.  Pt also had an elevated blood sugar this evening that was difficult to explain.  He said he had pasta for dinner but he received his usual coverage at that time.  Probable discharge tomorrow if output is decreased sufficiently  Patient vital signs are at baseline: Yes  Patient able to ambulate as they were prior to admission or with assist devices provided by therapies during their stay:  Yes  Patient MUST void prior to discharge:  Yes  Patient able to tolerate oral intake:  Yes  Pain has adequate pain control using Oral analgesics:  Yes

## 2021-06-15 NOTE — PROGRESS NOTES
"ACUPUNCTURIST TREATMENT NOTE    Name: Hardeep Silvestre  :  1955  MRN:  [id number]    Acupuncture Treatment  Patient Type: Orthopedic  Intervention Reason: Neuropathy  Neuropathy Location: Feet  Pre-session Neuropathy ratin  Post-session Neuropathy ratin  Patient complaint:: Chronic neuropathy of feet  Initial insertions: Lara 3, Kid 1, 3, Ba blanche     \"Risks and benefits of acupuncture were discussed with patient. Consent for treatment was given. We thank you for the referral.\"     Sonny Frausto    Date:  3/3/2021  Time:  11:55 AM    "

## 2021-06-15 NOTE — PROGRESS NOTES
Pharmacy Note - Admission Medication History  Pertinent Provider Information:    ______________________________________________________________________  Prior To Admission (PTA) med list completed and updated in EMR.     PTA Med List   Medication Sig Last Dose     aspirin 81 MG EC tablet Take 81 mg by mouth daily. 2/22/2021     atenolol (TENORMIN) 25 MG tablet Take 25 mg by mouth daily.  3/1/2021 at 0500     fluticasone (CUTIVATE) 0.05 % cream Apply 1 application topically daily as needed. 3 days per week as needed 2/25/2021     ibuprofen-diphenhydramine cit (ADVIL PM) 200-38 mg Tab Take 2 tablets by mouth at bedtime as needed.  1/25/2021     multivitamin with minerals (THERA-M) 9 mg iron-400 mcg Tab tablet Take 1 tablet by mouth daily. 3/1/2021 at Unknown time     nitroglycerin (NITROSTAT) 0.4 MG SL tablet Place 0.4 mg under the tongue every 5 (five) minutes as needed for chest pain.      omeprazole (PRILOSEC) 20 MG capsule Take 20 mg by mouth Daily before breakfast. 3/1/2021 at Unknown time     rosuvastatin (CRESTOR) 20 MG tablet Take 20 mg by mouth at bedtime. 2/28/2021 at Unknown time       Information source(s): Patient, Clinic records and [facility]  Method of interview communication: in-person  Patient was asked about OTC/herbal products specifically.  PTA med list reflects this.  Based on the pharmacist s assessment, the PTA med list information appears reliable  Allergies were reviewed, assessed, and updated with the patient.    Patient does not anticipate needing any multi-use medications during admission.   Thank you for the opportunity to participate in the care of this patient.    Lala Frausto, PharmD     3/1/2021     7:10 AM

## 2021-07-01 NOTE — CONSULTS
Consults by Nettie Campos MD at 3/1/2021  1:14 PM     Author: Nettie Campos MD Service: Family Medicine Author Type: Resident    Filed: 3/1/2021  2:37 PM Date of Service: 3/1/2021  1:14 PM Status: Attested    : Nettie Campos MD (Resident)    Related Notes: Original Note by Nettie Campos MD (Resident) filed at 3/1/2021  2:35 PM    Cosigner: Ayanna Comer MD at 3/1/2021  3:05 PM     Consult Orders    1. [facility] to Evaluate and recommend treatment for changes post-operatively given patients: HTN; Did you contact the consulting MD? No Sent page to Dr. Comer @5183 on 3/1/21 [[id number]] ordered by Chapin Lee PA-C at [phone]          Attestation signed by Ayanna Comer MD at 3/1/2021  3:05 PM    Faculty Supervision of Residents    I have examined this patient and the medical care has been evaluated and discussed with the resident.  The documentation has been reviewed.  I agree with the medical care provided and confirm the findings.   DVT prophylaxis will defer to orthopedics.    Ayanna Comer MD  Hospital medicine service                     Consultation - HTN  POST-OP IP CONSULT TO HOSPITALIST  Hardeep Silvestre,  1955, MRN [id number]    [facility]  Lumbar foraminal stenosis [M48.061]  Claudication (H) [I73.9]  Spondylolisthesis [M43.10]    PCP: Levy Rodriguez MD, [phone]   Code status:  Full Code       Extended Emergency Contact Information  Primary Emergency Contact: Yancy Silvestre  Address: [address] of [place]  Home Phone: [phone]  Relation: Spouse       Chief Complaint: Neurogenic claudication due to lumbar spinal stenosis       Assessment and Plan:  Principal Problem:    Neurogenic claudication due to lumbar spinal stenosis    # POD#0 - S/p Transforaminal Lumbar Interbody Fusion  # Back Pain / Radiculopathy  # History of spondylolisthesis L4-L5  - Postop care / pain management per  surgery    # HTN  - Continue PTA atenolol with hold parameters    # HLD / CAD  # History of MI s/p stent x1 (12/2004)  No cardiac issues since 2004.  - Continue PTA statin  - Hold ASA for now; resume per surgery recs  - Atenolol as above    # T2DM  Diet / exercise controlled only. Last A1C 7.2% at time of preop H&P.  Glucose elevated to 249 s/p decadron intraoperatively.  - Hold ASA for now  - Sliding scale insulin, glucose checks 4x daily    # GERD  Gets heartburn from statin.  - Continue PTA omeprazole      FEN/GI: Diabetic diet; IVF 50ml/hr until POD#1 per surgery recs  DVT PPX: SCDs  Dispo: Pending postoperative stabilization      Patient seen and discussed with Dr. Comer.  Nettie Campos MD PGY2  BFM      HPI:  Hardeep Silvestre is a 65 y.o. old male. History is provided by the patient.    Presented for elective back surgery today.  He has had back issues for many years.  He was a self-employed .  Most recent back surgery was in November 2020 4 herniated disc.  He has had bilateral lower extremity numbness, burning, and weakness which is made it progressively more difficult to walk.    Back pain is 5 out of 10 right now.  Burning in his feet has resolved.  There is still significant decrease sensation.  He denies any chest pain, shortness of breath, abdominal pain, dizziness.  Last bowel movement was this morning.  The week prior he denies any fevers or chills, cough or cold symptoms.    No history of sleep apnea or history of blood clots.       Medical History  There are no active non-hospital problems to display for this patient.    Past Medical History:   Diagnosis Date   ? Claudication (H)    ? H/O heart artery stent 2003   ? Hyperlipidemia    ? Hypertension    ? MI (myocardial infarction) (H)    ? PAD (peripheral artery disease) (H)         Surgical History  He  has a past surgical history that includes Shoulder surgery; Coronary angioplasty with stent; and Hernia repair. Multiple back  surgeries.       Social History  Reviewed, and he  reports that he quit smoking about 18 years ago. His smoking use included cigarettes. He smoked 2.00 packs per day. He has never used smokeless tobacco. He reports current alcohol use. He reports that he does not use drugs.        Family History  Reviewed, and family history includes Coronary artery disease in his father; Diabetes in his mother; Heart failure in his father; Liver disease in his mother.   Psychosocial Needs  Social History     Social History Narrative   ? Not on file     Additional psychosocial needs reviewed per nursing assessment.       Allergies   Allergen Reactions   ? Macadamia Nut Oil Anaphylaxis      Medications Prior to Admission   Medication Sig Dispense Refill Last Dose   ? aspirin 81 MG EC tablet Take 81 mg by mouth daily.   2/22/2021   ? atenolol (TENORMIN) 25 MG tablet Take 25 mg by mouth daily.   3 3/1/2021 at 0500   ? fluticasone (CUTIVATE) 0.05 % cream Apply 1 application topically daily as needed. 3 days per week as needed  1 2/25/2021   ? ibuprofen-diphenhydramine cit (ADVIL PM) 200-38 mg Tab Take 2 tablets by mouth at bedtime as needed.    1/25/2021   ? multivitamin with minerals (THERA-M) 9 mg iron-400 mcg Tab tablet Take 1 tablet by mouth daily.   3/1/2021 at Unknown time   ? nitroglycerin (NITROSTAT) 0.4 MG SL tablet Place 0.4 mg under the tongue every 5 (five) minutes as needed for chest pain.      ? omeprazole (PRILOSEC) 20 MG capsule Take 20 mg by mouth Daily before breakfast.   3/1/2021 at Unknown time   ? rosuvastatin (CRESTOR) 20 MG tablet Take 20 mg by mouth at bedtime.   2/28/2021 at Unknown time        Review of Systems:  Pertinent items are noted in HPI. Physical Exam:  Temp:  [97.5  F (36.4  C)-98.2  F (36.8  C)] 97.9  F (36.6  C)  Heart Rate:  [55-73] 68  Resp:  [12-24] 18  BP: (104-179)/(61-95) 140/72    General appearance: alert, appears stated age and cooperative  Head: Normocephalic, without obvious abnormality,  atraumatic  Eyes: Conjunctivae/corneas clear. PERRL, EOM's intact.  Ears: Hearing grossly intact.  Nose: Nares normal. Septum midline. Mucosa normal. No drainage or sinus tenderness., no discharge  Throat: lips, mucosa, and tongue normal; teeth and gums normal  Neck: no adenopathy, no carotid bruit, no JVD and supple, symmetrical, trachea midline  Back: Dressing over surgical site, drain in place, draining serosanguineous fluid  Lungs: clear to auscultation bilaterally, unlabored  Chest wall: no tenderness  Heart: regular rate and rhythm, S1, S2 normal, no murmur, click, rub or gallop  Abdomen: Obese, soft, non-tender; bowel sounds normal; no masses,  no organomegaly  Extremities: extremities normal, atraumatic, no cyanosis; SCDs in place, feet cool to touch but pulses 2+ bilaterally  Pulses: 2+ and symmetric  Skin: Skin color, texture, turgor normal. No rashes or lesions  Neurologic: Grossly normal; symmetric strength, bilateral lower extremity decreased sensation (baseline)             Pertinent Labs  personally reviewed.   Lab Results   Component Value Date     11/10/2020    K 5.2 (H) 11/10/2020     11/10/2020    CO2 25 11/10/2020    BUN 17 11/10/2020    CREATININE 0.96 11/10/2020    CALCIUM 9.4 11/10/2020     No results found for: CKTOTAL, CKMB, CKMBINDEX, TROPONINI  Lab Results   Component Value Date    WBC 6.4 12/10/2018    HGB 14.0 03/01/2021    HCT 45.3 12/10/2018    MCV 95 12/10/2018     12/10/2018     Lab Results   Component Value Date    CHOL 166 02/24/2021    TRIG 228 (H) 02/24/2021    HDL 33 (L) 02/24/2021    LDLDIRECT 106 01/05/2017         Pertinent Radiology  personally reviewed.

## 2021-07-03 NOTE — ADDENDUM NOTE
Addendum Note by Boubacar Seo MA at 1/16/2017 12:37 PM     Author: Boubacar Seo MA Service: -- Author Type: Medical Assistant    Filed: 1/16/2017 12:37 PM Encounter Date: 1/16/2017 Status: Signed    : Boubacar Seo MA (Medical Assistant)    Addended by: BOUBACAR SEO on: 1/16/2017 12:37 PM        Modules accepted: Orders

## 2021-07-06 ENCOUNTER — AMBULATORY - HEALTHEAST (OUTPATIENT)
Dept: LAB | Facility: HOSPITAL | Age: 66
End: 2021-07-06

## 2021-07-06 ENCOUNTER — RECORDS - HEALTHEAST (OUTPATIENT)
Dept: ADMINISTRATIVE | Facility: OTHER | Age: 66
End: 2021-07-06

## 2021-07-06 DIAGNOSIS — R79.9 ABNORMAL BLOOD CHEMISTRY: ICD-10-CM

## 2021-07-06 DIAGNOSIS — G62.9 PERIPHERAL NERVE DISORDER: ICD-10-CM

## 2021-07-06 LAB
B BURGDOR IGG+IGM SER QL: 0.06 INDEX VALUE
FOLATE SERPL-MCNC: 14.7 NG/ML
HBA1C MFR BLD: 7.4 %
VIT B12 SERPL-MCNC: 398 PG/ML (ref 213–816)

## 2021-07-07 LAB — ACE SERPL-CCNC: 41 U/L (ref 9–67)

## 2021-07-08 LAB
ALBUMIN PERCENT: 60 % (ref 51–67)
ALBUMIN SERPL ELPH-MCNC: 4.3 G/DL (ref 3.2–4.7)
ALPHA 1 PERCENT: 2.9 % (ref 2–4)
ALPHA 2 PERCENT: 11 % (ref 5–13)
ALPHA1 GLOB SERPL ELPH-MCNC: 0.2 G/DL (ref 0.1–0.3)
ALPHA2 GLOB SERPL ELPH-MCNC: 0.8 G/DL (ref 0.4–0.9)
B-GLOBULIN SERPL ELPH-MCNC: 1 G/DL (ref 0.7–1.2)
BETA PERCENT: 13.7 % (ref 10–17)
GAMMA GLOB SERPL ELPH-MCNC: 0.9 G/DL (ref 0.6–1.4)
GAMMA GLOBULIN PERCENT: 12.4 % (ref 9–20)
PATH ICD:: NORMAL
PATH ICD:: NORMAL
PROT PATTERN SERPL ELPH-IMP: NORMAL
PROT PATTERN SERPL IFE-IMP: NORMAL
PROT SERPL-MCNC: 7.2 G/DL (ref 6–8)
REVIEWING PATHOLOGIST: NORMAL
REVIEWING PATHOLOGIST: NORMAL

## 2021-07-09 LAB
ANA SER QL: 0.9 U
PYRIDOXAL PHOS SERPL-SCNC: 81 NMOL/L (ref 20–125)
VIT B1 PYROPHOSHATE BLD-SCNC: 150 NMOL/L (ref 70–180)

## 2021-07-21 ENCOUNTER — OFFICE VISIT (OUTPATIENT)
Dept: NEUROLOGY | Facility: CLINIC | Age: 66
End: 2021-07-21
Attending: PSYCHIATRY & NEUROLOGY
Payer: COMMERCIAL

## 2021-07-21 VITALS
BODY MASS INDEX: 27.63 KG/M2 | WEIGHT: 193 LBS | SYSTOLIC BLOOD PRESSURE: 129 MMHG | HEART RATE: 73 BPM | DIASTOLIC BLOOD PRESSURE: 84 MMHG | HEIGHT: 70 IN

## 2021-07-21 DIAGNOSIS — G62.9 PERIPHERAL POLYNEUROPATHY: ICD-10-CM

## 2021-07-21 DIAGNOSIS — E11.42 DIABETIC SENSORIMOTOR POLYNEUROPATHY (H): ICD-10-CM

## 2021-07-21 LAB — METHYLMALONATE SERPL-SCNC: NORMAL

## 2021-07-21 PROCEDURE — 95910 NRV CNDJ TEST 7-8 STUDIES: CPT | Performed by: PSYCHIATRY & NEUROLOGY

## 2021-07-21 PROCEDURE — 95886 MUSC TEST DONE W/N TEST COMP: CPT | Mod: RT | Performed by: PSYCHIATRY & NEUROLOGY

## 2021-07-21 PROCEDURE — 99213 OFFICE O/P EST LOW 20 MIN: CPT | Mod: 25 | Performed by: PSYCHIATRY & NEUROLOGY

## 2021-07-21 RX ORDER — DULOXETIN HYDROCHLORIDE 30 MG/1
30 CAPSULE, DELAYED RELEASE ORAL DAILY
Qty: 30 CAPSULE | Refills: 11 | Status: SHIPPED | OUTPATIENT
Start: 2021-07-21 | End: 2022-01-10

## 2021-07-21 ASSESSMENT — MIFFLIN-ST. JEOR: SCORE: 1666.69

## 2021-07-21 NOTE — NURSING NOTE
Chief Complaint   Patient presents with     Results     Discuss EMG and lab work     Mao Gray MA on 7/21/2021 at 1:11 PM

## 2021-07-21 NOTE — LETTER
7/21/2021         RE: Hardeep Silvestre  [address] [place] [id number]        Dear Colleague,    Thank you for referring your patient, Hardeep Silvestre, to the [facility] [place]. Please see a copy of my visit note below.    See procedure note for EMG report      Again, thank you for allowing me to participate in the care of your patient.        Sincerely,        Jamal Barahona MD

## 2021-07-21 NOTE — PROGRESS NOTES
NEUROLOGY FOLLOW UP VISIT  NOTE       [facility]UROLOGY [place]  [address] [place] [address]  Tel: [phone]  Fax: [phone]  [email or web address]     Hardeep Silvestre,  1955, MRN [id number]  PCP: Clint Gaona  Date: 2021      ASSESSMENT & PLAN     Diagnosis code  1. Diabetic sensorimotor polyneuropathy (H)     Diabetic sensorimotor polyneuropathy  65-year-old male with history of CAD, PAD, lumbar radiculopathy status post fusion, diabetic polyneuropathy previously evaluated at [facility] in 2017 who returns for follow-up.  He reports some improvement in his symptoms after tramadol was added to gabapentin.  However he feels extremely tired and is wondering if he can try some other medication.  Since his last visit he had an EMG done today that showed fairly advanced mixed sensorimotor polyneuropathy. Lab work included normal methylmalonic acid level, vitamin B1, vitamin B6, vitamin B12, folate, Lyme titer, angiotensin-converting enzyme, antinuclear antibody, SPEP and immunofixation.  Hemoglobin A1c was elevated at 7.4.  I have informed him he has diabetic polyneuropathy.  Alcohol might be also playing a role.  I have recommended:    1.  Start Cymbalta 30 mg daily  2.  Decrease gabapentin to 300 mg 3 times daily as patient is finding it sedating.  If Cymbalta helps in 3 weeks he will gradually taper himself off gabapentin  3.  Continue to use tramadol 1 tablet 3 times daily as needed  4.  Patient was told to stop drinking and have tighter glycemic control with hemoglobin A1c less than 6  5.  Follow-up will be in 3 months    Thank you again for this referral, please feel free to contact me if you have any questions.    Jamal Barahona MD  [facility]  (Formerly, [facility]     HISTORY OF PRESENT ILLNESS     Patient is a 65-year-old male with history of coronary artery disease, peripheral arterial  disease, hypertension, hyperlipidemia, spinal stenosis status post lumbar fusion was referred for evaluation of bilateral feet numbness and tingling that started more than 17 years ago.  Initially patient had some back pain and was noted to have left L5 radiculopathy.  He developed foot drop and had surgery done with improvement.  About 3 years ago due to persistent symptoms he was seen at [facility] and had a EMG that showed chronic left L5 radiculopathy but no compelling evidence of a large fiber peripheral neuropathy.  He had autonomic reflex screening at that time also that raise the possibility of early small fiber neuropathy.  Afterwards he also was evaluated by vascular surgery and had stent placed and was told that there is nothing much that can be done.  According to patient after evaluation at [facility] he was put on gabapentin for a short duration that he discontinued.  Recently his symptoms got worse and was evaluated by orthopedics and he had surgery done in March 2021 and had lumbar fusion done.  However he did not notice any improvement.  Although he is tried on different medication he feels only time he had any improvement was when he was on tramadol.  In addition to burning and tingling sensation in his leg he also reports impotence.  There is no history of any excessive constipation and postural hypotension.Patient is a 65-year-old male with history of coronary artery disease, peripheral arterial disease, hypertension, hyperlipidemia, spinal stenosis status post lumbar fusion was referred for evaluation of bilateral feet numbness and tingling that started more than 17 years ago.  Initially patient had some back pain and was noted to have left L5 radiculopathy.  He developed foot drop and had surgery done with improvement.  About 3 years ago due to persistent symptoms he was seen at [facility] and had a EMG that showed chronic left L5 radiculopathy but no compelling evidence of a large fiber  peripheral neuropathy.  He had autonomic reflex screening at that time also that raise the possibility of early small fiber neuropathy.  Afterwards he also was evaluated by vascular surgery and had stent placed and was told that there is nothing much that can be done.  According to patient after evaluation at [facility] he was put on gabapentin for a short duration that he discontinued.  Recently his symptoms got worse and was evaluated by orthopedics and he had surgery done in March 2021 and had lumbar fusion done.  However he did not notice any improvement.  Although he is tried on different medication he feels only time he had any improvement was when he was on tramadol.  In addition to burning and tingling sensation in his leg he also reports impotence.  There is no history of any excessive constipation and postural hypotension. Patient was evaluated in our clinic in 2017 with similar symptoms and at that time had an elevated hemoglobin A1c at 7.1, SPEP, antinuclear antibody, SSA/SSB antibody, rheumatoid factor, Lyme titer were normal.  He was tried on the Loxitane and gabapentin but did not find it very effective.    Since his last visit he had an EMG done today that shows fairly advanced mixed sensorimotor polyneuropathy.  Lab work included normal methylmalonic acid level, vitamin B1, vitamin B6, vitamin B12, folate, Lyme titer, angiotensin-converting enzyme, antinuclear antibody, SPEP and immunofixation.  Hemoglobin A1c was elevated at 7.4.     PROBLEM LIST   Patient Active Problem List   Diagnosis Code     Coronary artery disease involving native coronary artery of native heart without angina pectoris I25.10     Celiac disease K90.0     Type 2 diabetes mellitus with other specified complication, without long-term current use of insulin (H) E11.69     Essential hypertension I10     Hyperlipidemia E78.5     Neurogenic claudication due to lumbar spinal stenosis M48.062     Peripheral arterial disease (H) I73.9      Diabetic sensorimotor polyneuropathy (H) E11.42         PAST MEDICAL & SURGICAL HISTORY     Past Medical History:   Patient  has a past medical history of Claudication (H), H/O heart artery stent (2003), Hyperlipidemia, Hypertension, MI (myocardial infarction) (H), PAD (peripheral artery disease) (H), and Type 2 diabetes mellitus with other specified complication, without long-term current use of insulin (H).    Surgical History:  He  has a past surgical history that includes BILATERAL LUMBAR 3-4, LUMBAR 4-5 TRANSFORAMINAL LUMBAR INTERBODY FUSION; CORONARY ANGIOPLASTY WITH STENT PLACEMENT; hernia repair; shoulder surgery; Angioplasty; and hernia repair.     SOCIAL HISTORY     Reviewed, and he  reports that he quit smoking about 18 years ago. His smoking use included cigarettes. He smoked 2.00 packs per day. He has never used smokeless tobacco. He reports current alcohol use. He reports previous drug use.     FAMILY HISTORY     Reviewed, and family history includes Coronary Artery Disease in his father; Diabetes in his mother; Heart Failure in his father; Liver Disease in his mother.     ALLERGIES     Allergies   Allergen Reactions     Macadamia Nut Oil Anaphylaxis     Atorvastatin Muscle Pain (Myalgia)         REVIEW OF SYSTEMS     A 12 point review of system was performed and was negative except as outlined in the history of present illness.     HOME MEDICATIONS     Current Outpatient Rx   Medication Sig Dispense Refill     acetaminophen (TYLENOL) 500 MG tablet Take 1,000 mg by mouth       aspirin (ASA) 81 MG EC tablet Take 81 mg by mouth       atenolol (TENORMIN) 25 MG tablet Take 25 mg by mouth       clopidogrel (PLAVIX) 75 MG tablet Take 75 mg by mouth       gabapentin (NEURONTIN) 300 MG capsule TAKE 1 CAPSULE BY MOUTH EVERY MORNING AND 1 CAPSULE IN THE AFTERNOON AND 3 CAPSULES AT BEDTIME       losartan (COZAAR) 50 MG tablet Take 50 mg by mouth       metFORMIN (GLUCOPHAGE-XR) 500 MG 24 hr tablet Take 1,000  "mg by mouth       multivitamin, therapeutic with minerals (THERA-VIT-M) TABS tablet Take 1 tablet by mouth       nitroGLYcerin (NITROSTAT) 0.4 MG sublingual tablet Place 0.4 mg under the tongue       omeprazole (PRILOSEC) 20 MG DR capsule Take 20 mg by mouth       rosuvastatin (CRESTOR) 20 MG tablet Take 20 mg by mouth           PHYSICAL EXAM     Vital signs  /84 (BP Location: Right arm, Patient Position: Sitting)   Pulse 73   Ht 1.778 m (5' 10\")   Wt 87.5 kg (193 lb)   BMI 27.69 kg/m      Weight:   193 lbs 0 oz    Patient is alert and oriented x4 in no acute distress. Vital signs were reviewed and are documented in electronic medical record. Neck was supple, no carotid bruits, thyromegaly, JVD, or lymphadenopathy was noted.   NEUROLOGY EXAM:    Patient s speech was normal with no aphasia or dysarthria. Mentation, and affect were also normal.     Funduscopic exam was normal, with normal cup to disc ratio. Cranial nerves II -XII were intact.     Patient had normal mass, tone and motor strength was 5/5 in all extremities without pronator drift.     Sensation was decreased to light touch pinprick vibratory and temperature sensation below knees    Reflexes were 2+ in the upper extremity 1+ at knees absent at ankles    No dysmetria noted on FNF or HKS. Romberg was negative.    Gait testing was normal. Able to walk on toes/heels. Tandem walk normal.     DIAGNOSTIC STUDIES     PERTINENT RADIOLOGY  Following imaging studies were reviewed:     MRI LUMBAR SPINE 1/25/2021  1.  Unchanged mild L4-L5 degenerative disc disease with an unchanged small diffuse posterior disc bulge and previous postoperative changes of her treatment compressive laminectomy.  There is a small diffuse posterior disc bulge contributes to a mild midline spinal canal stenosis with mild narrowing of the right lateral recess, where there is redemonstrated slight contact of the traversing right L5 nerve root  2.  Unchanged moderate midline L3-L4 " spinal canal stenosis with slight narrowing of the right lateral recess and mild narrowing of the left L3-L4 lateral recess.  3.  Unchanged moderate bilateral L3-L4 and moderate left L4-L5 neuroforaminal stenosis  4.  Moderate bilateral L3-L4 facet arthropathy with interval development of small facet joint effusion.  This is accompanied by a small amount of adjacent degenerative osseous edema  5.  Additional severe bilateral L4-L5 facet arthropathy with unchanged 2 mm degenerative anterolisthesis of L3 on L4 and L4 and L5  6.  Relatively short pedicles in the lower lumbar spine contribute to a diffuse congenital spinal canal narrowing     MRI LUMBAR SPINE 3/9/2017  Multilevel degenerative lumbar spondylosis, a  developmentally narrowed mid to lower lumbar spinal canal, interval  bilateral decompressive laminectomies at L4-5 and the following  notable findings:  1.  Decreased size of 2 to 3 mm mildly cranially directed L4-5 right  paracentral disc protrusion with mild central stenosis, but no  traversing neural compression.  2.  Unchanged chronic moderate L3-4 and mild L2-3 central stenosis  without traversing neural compression.  3.  No acute fracture, spondylolysis or evidence of arachnoidal  adhesive disease.  4.  Chronic mild right/mild to moderate left L4-5 and mild bilateral  L3-4 foraminal stenosis without ganglionic compression.  5.  Mild bilateral L5-S1, moderate right/mild to moderate left L4-5  and moderate left/mild to moderate right L3-4 facet arthrosis.     AUTONOMIC REFLEX SCREEN 7/6/2017  There is evidence of focal postganglionic sudomotor dysfunction while cardiovagal and adrenergic functions are preserved. Local factors could be responsible for the result, but an early small fiber neuropathy cannot be excluded.     EMG 7/21/2021  This is a abnormal nerve conduction and EMG study of lower extremity that suggests length dependent mixed sensorimotor predominantly axonal polyneuropathy.    EMG  7/5/2017  Abnormal study. There is electrophysiologic evidence of a chronic,   inactive, left L5 lumbar radiculopathy. There is no compelling evidence of large fiber peripheral neuropathy.      PERTINENT LABS  Following labs were reviewed:  Ambulatory - [facility] on 07/06/2021   Component Date Value     Hemoglobin A1C 07/06/2021 7.4*     Albumin % 07/06/2021 60.0      Albumin 07/06/2021 4.3      Alpha 1 % 07/06/2021 2.9      Alpha 1 07/06/2021 0.2      Alpha 2 % 07/06/2021 11.0      Alpha 2 07/06/2021 0.8      Beta % 07/06/2021 13.7      Beta Globulin 07/06/2021 1.0      Gamma Globulin % 07/06/2021 12.4      Gamma Globulin 07/06/2021 0.9      ELP Interpretation 07/06/2021 Normal serum protein electrophoresis.      Protein Total 07/06/2021 7.2      Path ICD: 07/06/2021 G62.9      Reviewing Pathologist 07/06/2021 Gerald Self MD       Immunofixation ELP 07/06/2021 No monoclonal component identified.      Path ICD: 07/06/2021 G62.9      Reviewing Pathologist 07/06/2021 Gerald Self MD       Antinuclear Antibodies (* 07/06/2021 0.9      Angiotensin Converting E* 07/06/2021 41      Lyme Disease Antibodies * 07/06/2021 0.06      Folic Acid 07/06/2021 14.7      Vitamin B6 07/06/2021 81.0      Vitamin B1 Whole Blood L* 07/06/2021 150      Vitamin B12 07/06/2021 398      Methylmalonic Acid 07/06/2021 See Scanned Result          Total time spent for face to face visit, reviewing labs/imaging studies, counseling and coordination of care was: 20 minutes spent on the date of the encounter doing chart review, review of outside records, review of test results, interpretation of tests, patient visit and documentation       This note was dictated using voice recognition software.  Any grammatical or context distortions are unintentional and inherent to the software.    No orders of the defined types were placed in this encounter.     New Prescriptions    No medications on file     Modified Medications    No  medications on file

## 2021-07-21 NOTE — LETTER
2021         RE: Hardeep Silvestre  [address] [place] [id number]        Dear Colleague,    Thank you for referring your patient, Hardeep Silvestre, to the [facility] [place]. Please see a copy of my visit note below.    NEUROLOGY FOLLOW UP VISIT  NOTE       [facility] [place]  [address] [place] [address]  Tel: [phone]  Fax: [phone]  [email or web address]     Hardeep Silvestre,  1955, MRN [id number]  PCP: Clint Gaona  Date: 2021      ASSESSMENT & PLAN     Diagnosis code  1. Diabetic sensorimotor polyneuropathy (H)     Diabetic sensorimotor polyneuropathy  65-year-old male with history of CAD, PAD, lumbar radiculopathy status post fusion, diabetic polyneuropathy previously evaluated at [facility] in 2017 who returns for follow-up.  He reports some improvement in his symptoms after tramadol was added to gabapentin.  However he feels extremely tired and is wondering if he can try some other medication.  Since his last visit he had an EMG done today that showed fairly advanced mixed sensorimotor polyneuropathy. Lab work included normal methylmalonic acid level, vitamin B1, vitamin B6, vitamin B12, folate, Lyme titer, angiotensin-converting enzyme, antinuclear antibody, SPEP and immunofixation.  Hemoglobin A1c was elevated at 7.4.  I have informed him he has diabetic polyneuropathy.  Alcohol might be also playing a role.  I have recommended:    1.  Start Cymbalta 30 mg daily  2.  Decrease gabapentin to 300 mg 3 times daily as patient is finding it sedating.  If Cymbalta helps in 3 weeks he will gradually taper himself off gabapentin  3.  Continue to use tramadol 1 tablet 3 times daily as needed  4.  Patient was told to stop drinking and have tighter glycemic control with hemoglobin A1c less than 6  5.  Follow-up will be in 3 months    Thank you again for this referral, please feel free to contact me if you  have any questions.    Jamal Barahona MD  [facility]  (Former[facility], [facility]     HISTORY OF PRESENT ILLNESS     Patient is a 65-year-old male with history of coronary artery disease, peripheral arterial disease, hypertension, hyperlipidemia, spinal stenosis status post lumbar fusion was referred for evaluation of bilateral feet numbness and tingling that started more than 17 years ago.  Initially patient had some back pain and was noted to have left L5 radiculopathy.  He developed foot drop and had surgery done with improvement.  About 3 years ago due to persistent symptoms he was seen at [facility] and had a EMG that showed chronic left L5 radiculopathy but no compelling evidence of a large fiber peripheral neuropathy.  He had autonomic reflex screening at that time also that raise the possibility of early small fiber neuropathy.  Afterwards he also was evaluated by vascular surgery and had stent placed and was told that there is nothing much that can be done.  According to patient after evaluation at [facility] he was put on gabapentin for a short duration that he discontinued.  Recently his symptoms got worse and was evaluated by orthopedics and he had surgery done in March 2021 and had lumbar fusion done.  However he did not notice any improvement.  Although he is tried on different medication he feels only time he had any improvement was when he was on tramadol.  In addition to burning and tingling sensation in his leg he also reports impotence.  There is no history of any excessive constipation and postural hypotension.Patient is a 65-year-old male with history of coronary artery disease, peripheral arterial disease, hypertension, hyperlipidemia, spinal stenosis status post lumbar fusion was referred for evaluation of bilateral feet numbness and tingling that started more than 17 years ago.  Initially patient had some back pain and was noted to have  left L5 radiculopathy.  He developed foot drop and had surgery done with improvement.  About 3 years ago due to persistent symptoms he was seen at [facility] and had a EMG that showed chronic left L5 radiculopathy but no compelling evidence of a large fiber peripheral neuropathy.  He had autonomic reflex screening at that time also that raise the possibility of early small fiber neuropathy.  Afterwards he also was evaluated by vascular surgery and had stent placed and was told that there is nothing much that can be done.  According to patient after evaluation at [facility] he was put on gabapentin for a short duration that he discontinued.  Recently his symptoms got worse and was evaluated by orthopedics and he had surgery done in March 2021 and had lumbar fusion done.  However he did not notice any improvement.  Although he is tried on different medication he feels only time he had any improvement was when he was on tramadol.  In addition to burning and tingling sensation in his leg he also reports impotence.  There is no history of any excessive constipation and postural hypotension. Patient was evaluated in our clinic in 2017 with similar symptoms and at that time had an elevated hemoglobin A1c at 7.1, SPEP, antinuclear antibody, SSA/SSB antibody, rheumatoid factor, Lyme titer were normal.  He was tried on the Loxitane and gabapentin but did not find it very effective.    Since his last visit he had an EMG done today that shows fairly advanced mixed sensorimotor polyneuropathy.  Lab work included normal methylmalonic acid level, vitamin B1, vitamin B6, vitamin B12, folate, Lyme titer, angiotensin-converting enzyme, antinuclear antibody, SPEP and immunofixation.  Hemoglobin A1c was elevated at 7.4.     PROBLEM LIST   Patient Active Problem List   Diagnosis Code     Coronary artery disease involving native coronary artery of native heart without angina pectoris I25.10     Celiac disease K90.0     Type 2 diabetes  mellitus with other specified complication, without long-term current use of insulin (H) E11.69     Essential hypertension I10     Hyperlipidemia E78.5     Neurogenic claudication due to lumbar spinal stenosis M48.062     Peripheral arterial disease (H) I73.9     Diabetic sensorimotor polyneuropathy (H) E11.42         PAST MEDICAL & SURGICAL HISTORY     Past Medical History:   Patient  has a past medical history of Claudication (H), H/O heart artery stent (2003), Hyperlipidemia, Hypertension, MI (myocardial infarction) (H), PAD (peripheral artery disease) (H), and Type 2 diabetes mellitus with other specified complication, without long-term current use of insulin (H).    Surgical History:  He  has a past surgical history that includes BILATERAL LUMBAR 3-4, LUMBAR 4-5 TRANSFORAMINAL LUMBAR INTERBODY FUSION; CORONARY ANGIOPLASTY WITH STENT PLACEMENT; hernia repair; shoulder surgery; Angioplasty; and hernia repair.     SOCIAL HISTORY     Reviewed, and he  reports that he quit smoking about 18 years ago. His smoking use included cigarettes. He smoked 2.00 packs per day. He has never used smokeless tobacco. He reports current alcohol use. He reports previous drug use.     FAMILY HISTORY     Reviewed, and family history includes Coronary Artery Disease in his father; Diabetes in his mother; Heart Failure in his father; Liver Disease in his mother.     ALLERGIES     Allergies   Allergen Reactions     Macadamia Nut Oil Anaphylaxis     Atorvastatin Muscle Pain (Myalgia)         REVIEW OF SYSTEMS     A 12 point review of system was performed and was negative except as outlined in the history of present illness.     HOME MEDICATIONS     Current Outpatient Rx   Medication Sig Dispense Refill     acetaminophen (TYLENOL) 500 MG tablet Take 1,000 mg by mouth       aspirin (ASA) 81 MG EC tablet Take 81 mg by mouth       atenolol (TENORMIN) 25 MG tablet Take 25 mg by mouth       clopidogrel (PLAVIX) 75 MG tablet Take 75 mg by mouth   "     gabapentin (NEURONTIN) 300 MG capsule TAKE 1 CAPSULE BY MOUTH EVERY MORNING AND 1 CAPSULE IN THE AFTERNOON AND 3 CAPSULES AT BEDTIME       losartan (COZAAR) 50 MG tablet Take 50 mg by mouth       metFORMIN (GLUCOPHAGE-XR) 500 MG 24 hr tablet Take 1,000 mg by mouth       multivitamin, therapeutic with minerals (THERA-VIT-M) TABS tablet Take 1 tablet by mouth       nitroGLYcerin (NITROSTAT) 0.4 MG sublingual tablet Place 0.4 mg under the tongue       omeprazole (PRILOSEC) 20 MG DR capsule Take 20 mg by mouth       rosuvastatin (CRESTOR) 20 MG tablet Take 20 mg by mouth           PHYSICAL EXAM     Vital signs  /84 (BP Location: Right arm, Patient Position: Sitting)   Pulse 73   Ht 1.778 m (5' 10\")   Wt 87.5 kg (193 lb)   BMI 27.69 kg/m      Weight:   193 lbs 0 oz    Patient is alert and oriented x4 in no acute distress. Vital signs were reviewed and are documented in electronic medical record. Neck was supple, no carotid bruits, thyromegaly, JVD, or lymphadenopathy was noted.   NEUROLOGY EXAM:    Patient s speech was normal with no aphasia or dysarthria. Mentation, and affect were also normal.     Funduscopic exam was normal, with normal cup to disc ratio. Cranial nerves II -XII were intact.     Patient had normal mass, tone and motor strength was 5/5 in all extremities without pronator drift.     Sensation was decreased to light touch pinprick vibratory and temperature sensation below knees    Reflexes were 2+ in the upper extremity 1+ at knees absent at ankles    No dysmetria noted on FNF or HKS. Romberg was negative.    Gait testing was normal. Able to walk on toes/heels. Tandem walk normal.     DIAGNOSTIC STUDIES     PERTINENT RADIOLOGY  Following imaging studies were reviewed:     MRI LUMBAR SPINE 1/25/2021  1.  Unchanged mild L4-L5 degenerative disc disease with an unchanged small diffuse posterior disc bulge and previous postoperative changes of her treatment compressive laminectomy.  There is a " small diffuse posterior disc bulge contributes to a mild midline spinal canal stenosis with mild narrowing of the right lateral recess, where there is redemonstrated slight contact of the traversing right L5 nerve root  2.  Unchanged moderate midline L3-L4 spinal canal stenosis with slight narrowing of the right lateral recess and mild narrowing of the left L3-L4 lateral recess.  3.  Unchanged moderate bilateral L3-L4 and moderate left L4-L5 neuroforaminal stenosis  4.  Moderate bilateral L3-L4 facet arthropathy with interval development of small facet joint effusion.  This is accompanied by a small amount of adjacent degenerative osseous edema  5.  Additional severe bilateral L4-L5 facet arthropathy with unchanged 2 mm degenerative anterolisthesis of L3 on L4 and L4 and L5  6.  Relatively short pedicles in the lower lumbar spine contribute to a diffuse congenital spinal canal narrowing     MRI LUMBAR SPINE 3/9/2017  Multilevel degenerative lumbar spondylosis, a  developmentally narrowed mid to lower lumbar spinal canal, interval  bilateral decompressive laminectomies at L4-5 and the following  notable findings:  1.  Decreased size of 2 to 3 mm mildly cranially directed L4-5 right  paracentral disc protrusion with mild central stenosis, but no  traversing neural compression.  2.  Unchanged chronic moderate L3-4 and mild L2-3 central stenosis  without traversing neural compression.  3.  No acute fracture, spondylolysis or evidence of arachnoidal  adhesive disease.  4.  Chronic mild right/mild to moderate left L4-5 and mild bilateral  L3-4 foraminal stenosis without ganglionic compression.  5.  Mild bilateral L5-S1, moderate right/mild to moderate left L4-5  and moderate left/mild to moderate right L3-4 facet arthrosis.     AUTONOMIC REFLEX SCREEN 7/6/2017  There is evidence of focal postganglionic sudomotor dysfunction while cardiovagal and adrenergic functions are preserved. Local factors could be responsible for  the result, but an early small fiber neuropathy cannot be excluded.     EMG 7/21/2021  This is a abnormal nerve conduction and EMG study of lower extremity that suggests length dependent mixed sensorimotor predominantly axonal polyneuropathy.    EMG 7/5/2017  Abnormal study. There is electrophysiologic evidence of a chronic,   inactive, left L5 lumbar radiculopathy. There is no compelling evidence of large fiber peripheral neuropathy.      PERTINENT LABS  Following labs were reviewed:  Ambulatory - [facility] on 07/06/2021   Component Date Value     Hemoglobin A1C 07/06/2021 7.4*     Albumin % 07/06/2021 60.0      Albumin 07/06/2021 4.3      Alpha 1 % 07/06/2021 2.9      Alpha 1 07/06/2021 0.2      Alpha 2 % 07/06/2021 11.0      Alpha 2 07/06/2021 0.8      Beta % 07/06/2021 13.7      Beta Globulin 07/06/2021 1.0      Gamma Globulin % 07/06/2021 12.4      Gamma Globulin 07/06/2021 0.9      ELP Interpretation 07/06/2021 Normal serum protein electrophoresis.      Protein Total 07/06/2021 7.2      Path ICD: 07/06/2021 G62.9      Reviewing Pathologist 07/06/2021 Gerald Self MD       Immunofixation ELP 07/06/2021 No monoclonal component identified.      Path ICD: 07/06/2021 G62.9      Reviewing Pathologist 07/06/2021 Gerald Self MD       Antinuclear Antibodies (* 07/06/2021 0.9      Angiotensin Converting E* 07/06/2021 41      Lyme Disease Antibodies * 07/06/2021 0.06      Folic Acid 07/06/2021 14.7      Vitamin B6 07/06/2021 81.0      Vitamin B1 Whole Blood L* 07/06/2021 150      Vitamin B12 07/06/2021 398      Methylmalonic Acid 07/06/2021 See Scanned Result          Total time spent for face to face visit, reviewing labs/imaging studies, counseling and coordination of care was: 20 minutes spent on the date of the encounter doing chart review, review of outside records, review of test results, interpretation of tests, patient visit and documentation       This note was dictated using voice recognition  software.  Any grammatical or context distortions are unintentional and inherent to the software.    No orders of the defined types were placed in this encounter.     New Prescriptions    No medications on file     Modified Medications    No medications on file                     Again, thank you for allowing me to participate in the care of your patient.        Sincerely,        Jamal Barahona MD

## 2021-07-21 NOTE — PROCEDURES
ELECTROMYOGRAPHY (EMG) REPORT       [facility] NEUROLOGY [place]  [address] [place] [address]  Tel: [phone]  Fax: [phone]  [email or web address]     Hardeep Silvestre,  1955, MRN [id number]  PCP: Clint Gaona  Date: 2021     Principal Diagnosis: Peripheral polyneuropathy     Height: 5 feet 10 inch  Reason for referral: Evaluate bilateral lowers. c/o burning, numbness in both legs/feet > 10 years. Diabetic. h/o lumbar x 3, last one was a fusion.       Motor NCS      Nerve / Sites Lat Amp Dist Jeronimo    ms mV cm m/s   L Peroneal - EDB      Ankle 4.84 2.6 8       Fib head 12.97 2.3 29.5 36      Pop fossa 15.94 2.4 10 34   R Peroneal - EDB      Ankle 5.42 4.6 8       Fib head 13.75 4.0 27 32      Pop fossa 16.61 3.6 10 35   L Tibial - AH      Ankle 4.48 0.7 8       Pop fossa 15.89 0.8 40 35   R Tibial - AH      Ankle 4.90 3.5 8       Pop fossa 16.77 1.2 40 34       F  Wave      Nerve Fmin    ms   L Tibial - AH 74.95   R Tibial - AH 73.13       Sensory NCS      Nerve / Sites Onset Lat Pk Lat Amp.2-3 Dist Jeronimo    ms ms  V cm m/s   L Sural - Ankle (Calf)      Calf NR NR NR 14 NR   R Sural - Ankle (Calf)      Calf NR NR NR 14 NR   L Superficial peroneal - Ankle      Lat leg NR NR NR 12 NR   R Superficial peroneal - Ankle      Lat leg NR NR NR 12 NR       EMG Summary Table     Spontaneous MUAP Rcmt Note   Muscle Fib PSW Fasc IA # Amp Dur PPP Rate Type   L. Gluteus medius None None None N N N N N N N   L. Gluteus ravi None None None N N N N N N N   L. Biceps femoris (short head) None None None N N N N N N N   L. Adductor fox None None None N N N N N N N   L. Quadriceps None None None N N N N N N N   L. Tibialis anterior None None None N N N N N N N   L. Gastrocnemius (Medial head) None None None N N N N N N N   L. Tibialis posterior None None None Incr N N N N N N   R. Adductor fox None None None N N N N N N N   R. Quadriceps None None None N N N N N N N   R.  Tibialis anterior None None None N N N N N N N   R. Gastrocnemius (Medial head) None None None N N N N N N N   R. Tibialis posterior None None None Incr N N N N N N        Summary: Nerve conduction and EMG study of lower extremity shows:  1. Low amplitude bilateral peroneal and tibial compound motor action potentials with normal latencies and slow conduction velocities.  2. Delayed bilateral tibial F latencies  3. Absent bilateral sural and superficial peroneal SNAP  4. Needle exam showed changes as above    Impression:   This is a abnormal nerve conduction and EMG study of lower extremity that suggests length dependent mixed sensorimotor predominantly axonal polyneuropathy.      Jamal Barahona MD  [facility]  ([facility], [facility]      This note was dictated using voice recognition software.  Any grammatical or context distortions are unintentional and inherent to the software.

## 2021-08-03 DIAGNOSIS — G62.9 PERIPHERAL POLYNEUROPATHY: ICD-10-CM

## 2021-08-03 RX ORDER — TRAMADOL HYDROCHLORIDE 50 MG/1
TABLET ORAL
Qty: 90 TABLET | Refills: 0 | Status: SHIPPED | OUTPATIENT
Start: 2021-08-03 | End: 2021-10-04

## 2021-08-03 NOTE — TELEPHONE ENCOUNTER
Refill request for Tramadol  Last prescribed 6/10/21 for 50mg TID PRN #90  Medication T'd for review and signature  Kisha Paz CMA on 8/3/2021 at 4:15 PM

## 2021-08-09 ENCOUNTER — TELEPHONE (OUTPATIENT)
Dept: NEUROLOGY | Facility: CLINIC | Age: 66
End: 2021-08-09

## 2021-08-09 DIAGNOSIS — E11.42 DIABETIC SENSORIMOTOR POLYNEUROPATHY (H): Primary | ICD-10-CM

## 2021-08-09 RX ORDER — GABAPENTIN 300 MG/1
300 CAPSULE ORAL 3 TIMES DAILY
Qty: 90 CAPSULE | Refills: 11 | Status: SHIPPED | OUTPATIENT
Start: 2021-08-09 | End: 2021-08-19

## 2021-08-09 NOTE — TELEPHONE ENCOUNTER
Pt called re Duloxetine. He has been on it for 3 weeks and his symptoms have gotten worse. He has severe burning in his feet. He would like to go back on the Gabapentin.  [phone]

## 2021-08-09 NOTE — TELEPHONE ENCOUNTER
OK to discontinue Cymbalta & restart Neurontin at a low dose of 300 mg TID as he complained of sedation on higher dose. Rx sent to patient's pharmacy

## 2021-08-10 NOTE — TELEPHONE ENCOUNTER
Called Hardeep and left message:   OK to discontinue Cymbalta & restart Neurontin at a low dose of 300 mg TID as he complained of sedation on higher dose. Rx sent to patient's pharmacy    Kisha Paz CMA on 8/10/2021 at 2:51 PM

## 2021-08-19 RX ORDER — GABAPENTIN 300 MG/1
600 CAPSULE ORAL 3 TIMES DAILY
Qty: 180 CAPSULE | Refills: 11 | Status: SHIPPED | OUTPATIENT
Start: 2021-08-19 | End: 2021-12-01

## 2021-08-19 NOTE — TELEPHONE ENCOUNTER
Pt's wife Yancy called to ask that the Gabapentin dose be increased, as the current dose is not helping. Nor is the Tramadol. Call pt to advise [phone] ok to lm

## 2021-08-22 ENCOUNTER — HEALTH MAINTENANCE LETTER (OUTPATIENT)
Age: 66
End: 2021-08-22

## 2021-10-01 DIAGNOSIS — G62.9 PERIPHERAL POLYNEUROPATHY: ICD-10-CM

## 2021-10-04 RX ORDER — TRAMADOL HYDROCHLORIDE 50 MG/1
TABLET ORAL
Qty: 90 TABLET | Refills: 0 | Status: SHIPPED | OUTPATIENT
Start: 2021-10-04 | End: 2021-12-02

## 2021-10-04 NOTE — TELEPHONE ENCOUNTER
Refill request for Tramadol  Medication T'd for review and signature  Kisha Paz CMA on 10/4/2021 at 1:34 PM

## 2021-10-16 ENCOUNTER — HEALTH MAINTENANCE LETTER (OUTPATIENT)
Age: 66
End: 2021-10-16

## 2021-11-29 DIAGNOSIS — E11.42 DIABETIC SENSORIMOTOR POLYNEUROPATHY (H): ICD-10-CM

## 2021-11-29 DIAGNOSIS — G62.9 PERIPHERAL POLYNEUROPATHY: ICD-10-CM

## 2021-11-29 NOTE — TELEPHONE ENCOUNTER
[facility] Call Center    Phone Message    May a detailed message be left on voicemail: yes     Reason for Call: Other: Pt's wife call requesing a call back. they will like to know what else can pt take for neuropathy  while they wait for upcoming appt with .     Action Taken: Other: mpnu    Travel Screening: Not Applicable

## 2021-11-30 NOTE — TELEPHONE ENCOUNTER
Spoke with Yancy and Lavell- Lavell discontinued the Cymbalta after trying for 30 days as he does not feel that it helped.   Taking gabapentin 900mg every day.   Taking Tramadol 50mg every day which helps relieve burning pain.  His question is-   Can he increase the gabapentin? (Not as sedated anymore)  Can he take Tramadol at bedtime WITH an Advil PM as the Tramadol makes him more awake?  Please advise  Kisha Paz CMA on 11/30/2021 at 4:13 PM

## 2021-12-01 RX ORDER — GABAPENTIN 400 MG/1
800 CAPSULE ORAL 3 TIMES DAILY
Qty: 540 CAPSULE | Refills: 3 | Status: SHIPPED | OUTPATIENT
Start: 2021-12-01 | End: 2022-04-08 | Stop reason: ALTCHOICE

## 2021-12-02 RX ORDER — TRAMADOL HYDROCHLORIDE 50 MG/1
50 TABLET ORAL EVERY 8 HOURS PRN
Qty: 90 TABLET | Refills: 0 | Status: SHIPPED | OUTPATIENT
Start: 2021-12-02 | End: 2022-01-10

## 2021-12-02 NOTE — TELEPHONE ENCOUNTER
If he does not find gabapentin as sedating now it is okay to increase the dose to 800 mg 3 times daily.  I have sent a new prescription for gabapentin 400 mg 2 tablets 3 times daily to his pharmacy.  Okay to take 1 tramadol with Advil PM if that helps him sleep.  Follow-up as scheduled.

## 2021-12-02 NOTE — TELEPHONE ENCOUNTER
Patient informed- no further questions  Needs Tramadol refill  Medication T'd for review and signature  Kisha Paz CMA on 12/2/2021 at 8:44 AM

## 2022-01-10 ENCOUNTER — OFFICE VISIT (OUTPATIENT)
Dept: NEUROLOGY | Facility: CLINIC | Age: 67
End: 2022-01-10
Payer: COMMERCIAL

## 2022-01-10 VITALS
DIASTOLIC BLOOD PRESSURE: 84 MMHG | HEIGHT: 70 IN | HEART RATE: 67 BPM | SYSTOLIC BLOOD PRESSURE: 142 MMHG | BODY MASS INDEX: 27.63 KG/M2 | WEIGHT: 193 LBS

## 2022-01-10 DIAGNOSIS — E11.42 DIABETIC SENSORIMOTOR POLYNEUROPATHY (H): Primary | ICD-10-CM

## 2022-01-10 DIAGNOSIS — G62.9 PERIPHERAL POLYNEUROPATHY: ICD-10-CM

## 2022-01-10 PROCEDURE — 99214 OFFICE O/P EST MOD 30 MIN: CPT | Performed by: PSYCHIATRY & NEUROLOGY

## 2022-01-10 RX ORDER — PHENOL 1.4 %
10 AEROSOL, SPRAY (ML) MUCOUS MEMBRANE
COMMUNITY

## 2022-01-10 RX ORDER — TRAMADOL HYDROCHLORIDE 50 MG/1
50 TABLET ORAL EVERY 8 HOURS PRN
Qty: 90 TABLET | Refills: 5 | Status: SHIPPED | OUTPATIENT
Start: 2022-01-10 | End: 2022-07-12

## 2022-01-10 RX ORDER — PREGABALIN 75 MG/1
75 CAPSULE ORAL 3 TIMES DAILY
Qty: 90 CAPSULE | Refills: 5 | Status: SHIPPED | OUTPATIENT
Start: 2022-01-10 | End: 2022-04-11

## 2022-01-10 ASSESSMENT — MIFFLIN-ST. JEOR: SCORE: 1661.69

## 2022-01-10 NOTE — PROGRESS NOTES
NEUROLOGY FOLLOW UP VISIT  NOTE       [facility] NEUROLOGY [place]  [address] [place] [address]  Tel: [phone]  Fax: [phone]  [email or web address]     Hardeep Silvestre,  1955, MRN [id number]  PCP: Clint Gaona  Date: 01/10/2022      ASSESSMENT & PLAN     Visit Diagnosis  Diabetic sensorimotor polyneuropathy (H)     Diabetic polyneuropathy  66 years old male with history of CAD, PAD, HTN, lumbar stenosis, s/p fusion was been followed in our clinic for diabetic polyneuropathy.  He was tried on gabapentin, Cymbalta that did not seem to help.  He does find tramadol somewhat beneficial.  I have recommended:    1.  Gradually taper himself off gabapentin  2.  Start Lyrica 75 mg 3 times daily.  I will ramp up the dose depending on his response  3.  Continue to use tramadol 1 tablet up to 3 times daily as needed  4.  If maximizing the dose of Lyrica is not helpful we can consider Pristiq or carbamazepine.  Additionally topical capsaicin might be helpful  5.  Follow-up will be in 3 months    Thank you again for this referral, please feel free to contact me if you have any questions.    Jamal Barahona MD  [facility]  (Formerly, [facility]     HISTORY OF PRESENT ILLNESS     Patient is a 66 years old male with history of CAD, PAD, HTN, lumbar stenosis status post fusion who was last seen on 2021 for his peripheral neuropathy due to diabetes.  He reported some improvement in his symptoms with tramadol and gabapentin.  I had started him on Cymbalta and told him to decrease gabapentin and to continue tramadol.  He was told to stop drinking and have tighter glycemic control however he called as his symptoms were getting worse on Cymbalta and was told to discontinue Cymbalta and to continue on gabapentin.  Since his last visit he reports ongoing difficulty that prevents him from sleeping.  He is not using tramadol  every day but if the symptoms get worse he uses up to 3 times daily.  He does admit tramadol helps him sleep somewhat better.    Patient's symptoms started more than 17 years ago.  Initially he had some back pain and was noted to have left L5 radiculopathy.  He also developed foot drop and had surgery with some improvement.  Due to his persistent symptoms he was seen at [facility] and had EMG that showed chronic left L5 radiculopathy but no compelling evidence of large fiber peripheral neuropathy.  He also had autonomic reflex screening at that time that raised the possibility of early small fiber neuropathy.  He was also seen by vascular surgery and had a stent placed and was told there is nothing much that can be done.  He was started on gabapentin that he discontinued it.  His symptoms got worse and he was seen by orthopedics and had lumbar fusion done but it did not result in any improvement in his symptoms.  In addition to paresthesias he also had impotence but denied other autonomic symptoms.  In 2017 he was evaluated in our clinic with similar symptoms and his hemoglobin A1c was 7.1, antinuclear antibody, SSA/SSB antibody, rheumatoid factor, Lyme titer were normal.  He had repeat lab work in 2021 that showed normal methylmalonic acid level, vitamin B1, vitamin B6, vitamin B12, folate, Lyme titer, angiotensin-converting enzyme, antinuclear antibody, SPEP and immunofixation.  Hemoglobin A1c was elevated at 7.4.  EMG repeated in 2021 showed fairly advanced length dependent mixed sensorimotor polyneuropathy     PROBLEM LIST   Patient Active Problem List   Diagnosis Code     Coronary artery disease involving native coronary artery of native heart without angina pectoris I25.10     Celiac disease K90.0     Type 2 diabetes mellitus with other specified complication, without long-term current use of insulin (H) E11.69     Essential hypertension I10     Hyperlipidemia E78.5     Neurogenic claudication due to lumbar  spinal stenosis M48.062     Peripheral arterial disease (H) I73.9     Diabetic sensorimotor polyneuropathy (H) E11.42         PAST MEDICAL & SURGICAL HISTORY     Past Medical History:   Patient  has a past medical history of Claudication (H), H/O heart artery stent (2003), Hyperlipidemia, Hypertension, MI (myocardial infarction) (H), PAD (peripheral artery disease) (H), and Type 2 diabetes mellitus with other specified complication, without long-term current use of insulin (H).    Surgical History:  He  has a past surgical history that includes BILATERAL LUMBAR 3-4, LUMBAR 4-5 TRANSFORAMINAL LUMBAR INTERBODY FUSION; CORONARY ANGIOPLASTY WITH STENT PLACEMENT; hernia repair; shoulder surgery; Angioplasty; and hernia repair.     SOCIAL HISTORY     Reviewed, and he  reports that he quit smoking about 18 years ago. His smoking use included cigarettes. He smoked 2.00 packs per day. He has never used smokeless tobacco. He reports current alcohol use. He reports previous drug use.     FAMILY HISTORY     Reviewed, and family history includes Coronary Artery Disease in his father; Diabetes in his mother; Heart Failure in his father; Liver Disease in his mother.     ALLERGIES     Allergies   Allergen Reactions     Macadamia Nut Oil Anaphylaxis     Atorvastatin Muscle Pain (Myalgia)         REVIEW OF SYSTEMS     A 12 point review of system was performed and was negative except as outlined in the history of present illness.     HOME MEDICATIONS     Current Outpatient Rx   Medication Sig Dispense Refill     acetaminophen (TYLENOL) 500 MG tablet Take 1,000 mg by mouth       aspirin (ASA) 81 MG EC tablet Take 81 mg by mouth       atenolol (TENORMIN) 25 MG tablet Take 25 mg by mouth       diphenhydrAMINE-acetaminophen (TYLENOL PM)  MG tablet Take 1 tablet by mouth nightly as needed for sleep       gabapentin (NEURONTIN) 400 MG capsule Take 2 capsules (800 mg) by mouth 3 times daily 540 capsule 3     Ibuprofen-diphenhydrAMINE  "Cit (ADVIL PM PO)        losartan (COZAAR) 50 MG tablet Take 50 mg by mouth       Melatonin 10 MG TABS tablet Take 10 mg by mouth nightly as needed for sleep       metFORMIN (GLUCOPHAGE-XR) 500 MG 24 hr tablet Take 1,000 mg by mouth       multivitamin, therapeutic with minerals (THERA-VIT-M) TABS tablet Take 1 tablet by mouth       nitroGLYcerin (NITROSTAT) 0.4 MG sublingual tablet Place 0.4 mg under the tongue       omeprazole (PRILOSEC) 20 MG DR capsule Take 20 mg by mouth       pregabalin (LYRICA) 75 MG capsule Take 1 capsule (75 mg) by mouth 3 times daily 90 capsule 5     rosuvastatin (CRESTOR) 20 MG tablet Take 20 mg by mouth       traMADol (ULTRAM) 50 MG tablet Take 1 tablet (50 mg) by mouth every 8 hours as needed for severe pain 90 tablet 5         PHYSICAL EXAM     Vital signs  BP (!) 142/84 (BP Location: Right arm, Patient Position: Sitting)   Pulse 67   Ht 1.778 m (5' 10\")   Wt 87.5 kg (193 lb)   BMI 27.69 kg/m      Weight:   193 lbs 0 oz    Patient is alert and oriented x4 in no acute distress. Vital signs were reviewed and are documented in electronic medical record. Neck was supple, no carotid bruits, thyromegaly, JVD, or lymphadenopathy was noted.   NEUROLOGY EXAM:    Patient s speech was normal with no aphasia or dysarthria. Mentation, and affect were also normal.     Funduscopic exam was normal, with normal cup to disc ratio. Cranial nerves II -XII were intact.     Patient had normal mass, tone and motor strength was 5/5 in all extremities without pronator drift.     Sensation was decreased to light touch pinprick vibratory and temperature sensation below knees    Reflexes were 2+ in the upper extremity 1+ at knees absent at ankles    No dysmetria noted on FNF or HKS. Romberg was negative.    Gait testing was normal. Able to walk on toes/heels. Tandem walk normal.     DIAGNOSTIC STUDIES     PERTINENT RADIOLOGY  Following imaging studies were reviewed:     MRI LUMBAR SPINE 1/25/2021  1. "  Unchanged mild L4-L5 degenerative disc disease with an unchanged small diffuse posterior disc bulge and previous postoperative changes of her treatment compressive laminectomy.  There is a small diffuse posterior disc bulge contributes to a mild midline spinal canal stenosis with mild narrowing of the right lateral recess, where there is redemonstrated slight contact of the traversing right L5 nerve root  2.  Unchanged moderate midline L3-L4 spinal canal stenosis with slight narrowing of the right lateral recess and mild narrowing of the left L3-L4 lateral recess.  3.  Unchanged moderate bilateral L3-L4 and moderate left L4-L5 neuroforaminal stenosis  4.  Moderate bilateral L3-L4 facet arthropathy with interval development of small facet joint effusion.  This is accompanied by a small amount of adjacent degenerative osseous edema  5.  Additional severe bilateral L4-L5 facet arthropathy with unchanged 2 mm degenerative anterolisthesis of L3 on L4 and L4 and L5  6.  Relatively short pedicles in the lower lumbar spine contribute to a diffuse congenital spinal canal narrowing     MRI LUMBAR SPINE 3/9/2017  Multilevel degenerative lumbar spondylosis, a  developmentally narrowed mid to lower lumbar spinal canal, interval  bilateral decompressive laminectomies at L4-5 and the following  notable findings:  1.  Decreased size of 2 to 3 mm mildly cranially directed L4-5 right  paracentral disc protrusion with mild central stenosis, but no  traversing neural compression.  2.  Unchanged chronic moderate L3-4 and mild L2-3 central stenosis  without traversing neural compression.  3.  No acute fracture, spondylolysis or evidence of arachnoidal  adhesive disease.  4.  Chronic mild right/mild to moderate left L4-5 and mild bilateral  L3-4 foraminal stenosis without ganglionic compression.  5.  Mild bilateral L5-S1, moderate right/mild to moderate left L4-5  and moderate left/mild to moderate right L3-4 facet  arthrosis.     AUTONOMIC REFLEX SCREEN 7/6/2017  There is evidence of focal postganglionic sudomotor dysfunction while cardiovagal and adrenergic functions are preserved. Local factors could be responsible for the result, but an early small fiber neuropathy cannot be excluded.     EMG 7/21/2021  This is a abnormal nerve conduction and EMG study of lower extremity that suggests length dependent mixed sensorimotor predominantly axonal polyneuropathy.     EMG 7/5/2017  Abnormal study. There is electrophysiologic evidence of a chronic,   inactive, left L5 lumbar radiculopathy. There is no compelling evidence of large fiber peripheral neuropathy.      PERTINENT LABS  Following labs were reviewed:  No visits with results within 3 Month(s) from this visit.   Latest known visit with results is:   Ambulatory - [facility] on 07/06/2021   Component Date Value     Hemoglobin A1C 07/06/2021 7.4*     Albumin % 07/06/2021 60.0      Albumin 07/06/2021 4.3      Alpha 1 % 07/06/2021 2.9      Alpha 1 07/06/2021 0.2      Alpha 2 % 07/06/2021 11.0      Alpha 2 07/06/2021 0.8      Beta % 07/06/2021 13.7      Beta Globulin 07/06/2021 1.0      Gamma Globulin % 07/06/2021 12.4      Gamma Globulin 07/06/2021 0.9      ELP Interpretation 07/06/2021 Normal serum protein electrophoresis.      Protein Total 07/06/2021 7.2      Path ICD: 07/06/2021 G62.9      Reviewing Pathologist 07/06/2021 Gerald Self MD       Immunofixation ELP 07/06/2021 No monoclonal component identified.      Path ICD: 07/06/2021 G62.9      Reviewing Pathologist 07/06/2021 Gerald Self MD       Antinuclear Antibodies (* 07/06/2021 0.9      Angiotensin Converting E* 07/06/2021 41      Lyme Disease Antibodies * 07/06/2021 0.06      Folic Acid 07/06/2021 14.7      Vitamin B6 07/06/2021 81.0      Vitamin B1 Whole Blood L* 07/06/2021 150      Vitamin B12 07/06/2021 398      Methylmalonic Acid 07/06/2021 See Scanned Result          Total time spent for face to face  visit, reviewing labs/imaging studies, counseling and coordination of care was: 30 Minutes spent on the date of the encounter doing chart review, review of outside records, review of test results, interpretation of tests, patient visit and documentation     This note was dictated using voice recognition software.  Any grammatical or context distortions are unintentional and inherent to the software.    No orders of the defined types were placed in this encounter.     New Prescriptions    PREGABALIN (LYRICA) 75 MG CAPSULE    Take 1 capsule (75 mg) by mouth 3 times daily     Modified Medications    Modified Medication Previous Medication    TRAMADOL (ULTRAM) 50 MG TABLET traMADol (ULTRAM) 50 MG tablet       Take 1 tablet (50 mg) by mouth every 8 hours as needed for severe pain    Take 1 tablet (50 mg) by mouth every 8 hours as needed for severe pain

## 2022-01-10 NOTE — PATIENT INSTRUCTIONS
Patient Education   Coping with Nerve Damage (Peripheral Neuropathy)  What causes nerve damage?  Some illnesses, certain medicines, injury and very poor diet may cause problems with some nerves in your body (called peripheral neuropathy).  You may feel tingling, itching, burning, weakness or numbness in your fingers, hands, toes or feet. You may have less feeling: You may be unable to feel hot, cold or pain.  While this may improve over time, there could be long-term problems.  How is it treated?  If your chemotherapy drugs are the cause of the nerve damage, your care team may stop them or change to safer drugs.  There are several medicines that are helpful in treating nerve damage.  You may also ask your care team about alternate treatments, such as:    Acupuncture    Massage    Physical therapy    A referral to [facility].  What else can I do to protect myself?    Protect your feet, especially if they are numb or have less feeling than usual. Always wear well-fitted shoes with rubber soles, even around the house.    Move carefully, especially on the stairs. Use the handrails.    Apply nonskid surfaces on floors, tubs and showers so you do not slip and fall..    Remove items, such as rugs, that might make you trip.    Keep rooms well lighted.    Protect your hands and fingers if they are numb or have less feeling. Be careful when grasping hot or sharp objects.    Lower the temperature on your hot water heater as needed.    Wear padded gloves when you reach into the oven or  hot pots and pans. Be extra careful when cooking or ironing.    Inspect skin for cuts, scrapes and burns daily    Wear heavy work gloves when you dig in the garden or work around thorny plants.    Keep walking or doing other mild exercise.    Share your feelings and worries about nerve damage with your family, friends, darby leader and care team. A support group may also help--be sure to ask your care team  for information.  When should I call my care team?  Call your care team if:    The symptoms (tingling, burning, weakness, numbness) get worse in your fingers, hands, toes or feet.    You have trouble using buttons or zippers on your clothes.    You have trouble walking.    You hurt yourself and the area becomes red, painful or swollen.  Comments:  __________________________________________  __________________________________________  __________________________________________  __________________________________________  __________________________________________  __________________________________________  For informational purposes only. Not to replace the advice of your health care provider. Copyright   2006 [employer]. All rights reserved. Clinically reviewed by [facility]. [employer] [id number] - REV 04/19

## 2022-01-10 NOTE — LETTER
1/10/2022         RE: Hardeep Silvestre  [address] [place] [id number]        Dear Colleague,    Thank you for referring your patient, Hardeep Silvestre, to the [facility] [place]. Please see a copy of my visit note below.    NEUROLOGY FOLLOW UP VISIT  NOTE       [facility] NEUROLOG[facility] [place]  [address] [place] [address]  Tel: [phone]  Fax: [phone]  [email or web address]     Hardeep Silvestre,  1955, MRN [id number]  PCP: Clint Gaona  Date: 01/10/2022      ASSESSMENT & PLAN     Visit Diagnosis  1. Diabetic sensorimotor polyneuropathy (H)     Diabetic polyneuropathy  66 years old male with history of CAD, PAD, HTN, lumbar stenosis, s/p fusion was been followed in our clinic for diabetic polyneuropathy.  He was tried on gabapentin, Cymbalta that did not seem to help.  He does find tramadol somewhat beneficial.  I have recommended:    1.  Gradually taper himself off gabapentin  2.  Start Lyrica 75 mg 3 times daily.  I will ramp up the dose depending on his response  3.  Continue to use tramadol 1 tablet up to 3 times daily as needed  4.  If maximizing the dose of Lyrica is not helpful we can consider Pristiq or carbamazepine.  Additionally topical capsaicin might be helpful  5.  Follow-up will be in 3 months    Thank you again for this referral, please feel free to contact me if you have any questions.    Jamal Barahona MD  [facility]  (Formerly, [facility]     HISTORY OF PRESENT ILLNESS     Patient is a 66 years old male with history of CAD, PAD, HTN, lumbar stenosis status post fusion who was last seen on 2021 for his peripheral neuropathy due to diabetes.  He reported some improvement in his symptoms with tramadol and gabapentin.  I had started him on Cymbalta and told him to decrease gabapentin and to continue tramadol.  He was told to stop drinking and have  tighter glycemic control however he called as his symptoms were getting worse on Cymbalta and was told to discontinue Cymbalta and to continue on gabapentin.  Since his last visit he reports ongoing difficulty that prevents him from sleeping.  He is not using tramadol every day but if the symptoms get worse he uses up to 3 times daily.  He does admit tramadol helps him sleep somewhat better.    Patient's symptoms started more than 17 years ago.  Initially he had some back pain and was noted to have left L5 radiculopathy.  He also developed foot drop and had surgery with some improvement.  Due to his persistent symptoms he was seen at [facility] and had EMG that showed chronic left L5 radiculopathy but no compelling evidence of large fiber peripheral neuropathy.  He also had autonomic reflex screening at that time that raised the possibility of early small fiber neuropathy.  He was also seen by vascular surgery and had a stent placed and was told there is nothing much that can be done.  He was started on gabapentin that he discontinued it.  His symptoms got worse and he was seen by orthopedics and had lumbar fusion done but it did not result in any improvement in his symptoms.  In addition to paresthesias he also had impotence but denied other autonomic symptoms.  In 2017 he was evaluated in our clinic with similar symptoms and his hemoglobin A1c was 7.1, antinuclear antibody, SSA/SSB antibody, rheumatoid factor, Lyme titer were normal.  He had repeat lab work in 2021 that showed normal methylmalonic acid level, vitamin B1, vitamin B6, vitamin B12, folate, Lyme titer, angiotensin-converting enzyme, antinuclear antibody, SPEP and immunofixation.  Hemoglobin A1c was elevated at 7.4.  EMG repeated in 2021 showed fairly advanced length dependent mixed sensorimotor polyneuropathy     PROBLEM LIST   Patient Active Problem List   Diagnosis Code     Coronary artery disease involving native coronary artery of native heart  without angina pectoris I25.10     Celiac disease K90.0     Type 2 diabetes mellitus with other specified complication, without long-term current use of insulin (H) E11.69     Essential hypertension I10     Hyperlipidemia E78.5     Neurogenic claudication due to lumbar spinal stenosis M48.062     Peripheral arterial disease (H) I73.9     Diabetic sensorimotor polyneuropathy (H) E11.42         PAST MEDICAL & SURGICAL HISTORY     Past Medical History:   Patient  has a past medical history of Claudication (H), H/O heart artery stent (2003), Hyperlipidemia, Hypertension, MI (myocardial infarction) (H), PAD (peripheral artery disease) (H), and Type 2 diabetes mellitus with other specified complication, without long-term current use of insulin (H).    Surgical History:  He  has a past surgical history that includes BILATERAL LUMBAR 3-4, LUMBAR 4-5 TRANSFORAMINAL LUMBAR INTERBODY FUSION; CORONARY ANGIOPLASTY WITH STENT PLACEMENT; hernia repair; shoulder surgery; Angioplasty; and hernia repair.     SOCIAL HISTORY     Reviewed, and he  reports that he quit smoking about 18 years ago. His smoking use included cigarettes. He smoked 2.00 packs per day. He has never used smokeless tobacco. He reports current alcohol use. He reports previous drug use.     FAMILY HISTORY     Reviewed, and family history includes Coronary Artery Disease in his father; Diabetes in his mother; Heart Failure in his father; Liver Disease in his mother.     ALLERGIES     Allergies   Allergen Reactions     Macadamia Nut Oil Anaphylaxis     Atorvastatin Muscle Pain (Myalgia)         REVIEW OF SYSTEMS     A 12 point review of system was performed and was negative except as outlined in the history of present illness.     HOME MEDICATIONS     Current Outpatient Rx   Medication Sig Dispense Refill     acetaminophen (TYLENOL) 500 MG tablet Take 1,000 mg by mouth       aspirin (ASA) 81 MG EC tablet Take 81 mg by mouth       atenolol (TENORMIN) 25 MG tablet Take  "25 mg by mouth       diphenhydrAMINE-acetaminophen (TYLENOL PM)  MG tablet Take 1 tablet by mouth nightly as needed for sleep       gabapentin (NEURONTIN) 400 MG capsule Take 2 capsules (800 mg) by mouth 3 times daily 540 capsule 3     Ibuprofen-diphenhydrAMINE Cit (ADVIL PM PO)        losartan (COZAAR) 50 MG tablet Take 50 mg by mouth       Melatonin 10 MG TABS tablet Take 10 mg by mouth nightly as needed for sleep       metFORMIN (GLUCOPHAGE-XR) 500 MG 24 hr tablet Take 1,000 mg by mouth       multivitamin, therapeutic with minerals (THERA-VIT-M) TABS tablet Take 1 tablet by mouth       nitroGLYcerin (NITROSTAT) 0.4 MG sublingual tablet Place 0.4 mg under the tongue       omeprazole (PRILOSEC) 20 MG DR capsule Take 20 mg by mouth       pregabalin (LYRICA) 75 MG capsule Take 1 capsule (75 mg) by mouth 3 times daily 90 capsule 5     rosuvastatin (CRESTOR) 20 MG tablet Take 20 mg by mouth       traMADol (ULTRAM) 50 MG tablet Take 1 tablet (50 mg) by mouth every 8 hours as needed for severe pain 90 tablet 5         PHYSICAL EXAM     Vital signs  BP (!) 142/84 (BP Location: Right arm, Patient Position: Sitting)   Pulse 67   Ht 1.778 m (5' 10\")   Wt 87.5 kg (193 lb)   BMI 27.69 kg/m      Weight:   193 lbs 0 oz    Patient is alert and oriented x4 in no acute distress. Vital signs were reviewed and are documented in electronic medical record. Neck was supple, no carotid bruits, thyromegaly, JVD, or lymphadenopathy was noted.   NEUROLOGY EXAM:    Patient s speech was normal with no aphasia or dysarthria. Mentation, and affect were also normal.     Funduscopic exam was normal, with normal cup to disc ratio. Cranial nerves II -XII were intact.     Patient had normal mass, tone and motor strength was 5/5 in all extremities without pronator drift.     Sensation was decreased to light touch pinprick vibratory and temperature sensation below knees    Reflexes were 2+ in the upper extremity 1+ at knees absent at " ankles    No dysmetria noted on FNF or HKS. Romberg was negative.    Gait testing was normal. Able to walk on toes/heels. Tandem walk normal.     DIAGNOSTIC STUDIES     PERTINENT RADIOLOGY  Following imaging studies were reviewed:     MRI LUMBAR SPINE 1/25/2021  1.  Unchanged mild L4-L5 degenerative disc disease with an unchanged small diffuse posterior disc bulge and previous postoperative changes of her treatment compressive laminectomy.  There is a small diffuse posterior disc bulge contributes to a mild midline spinal canal stenosis with mild narrowing of the right lateral recess, where there is redemonstrated slight contact of the traversing right L5 nerve root  2.  Unchanged moderate midline L3-L4 spinal canal stenosis with slight narrowing of the right lateral recess and mild narrowing of the left L3-L4 lateral recess.  3.  Unchanged moderate bilateral L3-L4 and moderate left L4-L5 neuroforaminal stenosis  4.  Moderate bilateral L3-L4 facet arthropathy with interval development of small facet joint effusion.  This is accompanied by a small amount of adjacent degenerative osseous edema  5.  Additional severe bilateral L4-L5 facet arthropathy with unchanged 2 mm degenerative anterolisthesis of L3 on L4 and L4 and L5  6.  Relatively short pedicles in the lower lumbar spine contribute to a diffuse congenital spinal canal narrowing     MRI LUMBAR SPINE 3/9/2017  Multilevel degenerative lumbar spondylosis, a  developmentally narrowed mid to lower lumbar spinal canal, interval  bilateral decompressive laminectomies at L4-5 and the following  notable findings:  1.  Decreased size of 2 to 3 mm mildly cranially directed L4-5 right  paracentral disc protrusion with mild central stenosis, but no  traversing neural compression.  2.  Unchanged chronic moderate L3-4 and mild L2-3 central stenosis  without traversing neural compression.  3.  No acute fracture, spondylolysis or evidence of arachnoidal  adhesive disease.  4.   Chronic mild right/mild to moderate left L4-5 and mild bilateral  L3-4 foraminal stenosis without ganglionic compression.  5.  Mild bilateral L5-S1, moderate right/mild to moderate left L4-5  and moderate left/mild to moderate right L3-4 facet arthrosis.     AUTONOMIC REFLEX SCREEN 7/6/2017  There is evidence of focal postganglionic sudomotor dysfunction while cardiovagal and adrenergic functions are preserved. Local factors could be responsible for the result, but an early small fiber neuropathy cannot be excluded.     EMG 7/21/2021  This is a abnormal nerve conduction and EMG study of lower extremity that suggests length dependent mixed sensorimotor predominantly axonal polyneuropathy.     EMG 7/5/2017  Abnormal study. There is electrophysiologic evidence of a chronic,   inactive, left L5 lumbar radiculopathy. There is no compelling evidence of large fiber peripheral neuropathy.      PERTINENT LABS  Following labs were reviewed:  No visits with results within 3 Month(s) from this visit.   Latest known visit with results is:   Ambulatory - [facility] on 07/06/2021   Component Date Value     Hemoglobin A1C 07/06/2021 7.4*     Albumin % 07/06/2021 60.0      Albumin 07/06/2021 4.3      Alpha 1 % 07/06/2021 2.9      Alpha 1 07/06/2021 0.2      Alpha 2 % 07/06/2021 11.0      Alpha 2 07/06/2021 0.8      Beta % 07/06/2021 13.7      Beta Globulin 07/06/2021 1.0      Gamma Globulin % 07/06/2021 12.4      Gamma Globulin 07/06/2021 0.9      ELP Interpretation 07/06/2021 Normal serum protein electrophoresis.      Protein Total 07/06/2021 7.2      Path ICD: 07/06/2021 G62.9      Reviewing Pathologist 07/06/2021 Gerald Self MD       Immunofixation ELP 07/06/2021 No monoclonal component identified.      Path ICD: 07/06/2021 G62.9      Reviewing Pathologist 07/06/2021 Gerald Self MD       Antinuclear Antibodies (* 07/06/2021 0.9      Angiotensin Converting E* 07/06/2021 41      Lyme Disease Antibodies * 07/06/2021  0.06      Folic Acid 07/06/2021 14.7      Vitamin B6 07/06/2021 81.0      Vitamin B1 Whole Blood L* 07/06/2021 150      Vitamin B12 07/06/2021 398      Methylmalonic Acid 07/06/2021 See Scanned Result          Total time spent for face to face visit, reviewing labs/imaging studies, counseling and coordination of care was: 30 Minutes spent on the date of the encounter doing chart review, review of outside records, review of test results, interpretation of tests, patient visit and documentation       This note was dictated using voice recognition software.  Any grammatical or context distortions are unintentional and inherent to the software.    No orders of the defined types were placed in this encounter.     New Prescriptions    PREGABALIN (LYRICA) 75 MG CAPSULE    Take 1 capsule (75 mg) by mouth 3 times daily     Modified Medications    Modified Medication Previous Medication    TRAMADOL (ULTRAM) 50 MG TABLET traMADol (ULTRAM) 50 MG tablet       Take 1 tablet (50 mg) by mouth every 8 hours as needed for severe pain    Take 1 tablet (50 mg) by mouth every 8 hours as needed for severe pain                     Again, thank you for allowing me to participate in the care of your patient.        Sincerely,        Jamal Barahona MD

## 2022-02-10 ENCOUNTER — TRANSCRIBE ORDERS (OUTPATIENT)
Dept: OTHER | Age: 67
End: 2022-02-10
Payer: COMMERCIAL

## 2022-02-10 DIAGNOSIS — I70.211 ATHEROSCLEROSIS OF NATIVE ARTERY OF RIGHT LOWER EXTREMITY WITH INTERMITTENT CLAUDICATION (H): Primary | ICD-10-CM

## 2022-04-02 ENCOUNTER — HEALTH MAINTENANCE LETTER (OUTPATIENT)
Age: 67
End: 2022-04-02

## 2022-04-11 ENCOUNTER — OFFICE VISIT (OUTPATIENT)
Dept: NEUROLOGY | Facility: CLINIC | Age: 67
End: 2022-04-11
Payer: COMMERCIAL

## 2022-04-11 VITALS
WEIGHT: 191 LBS | HEART RATE: 76 BPM | DIASTOLIC BLOOD PRESSURE: 79 MMHG | BODY MASS INDEX: 27.35 KG/M2 | SYSTOLIC BLOOD PRESSURE: 150 MMHG | HEIGHT: 70 IN

## 2022-04-11 DIAGNOSIS — E11.42 DIABETIC SENSORIMOTOR POLYNEUROPATHY (H): Primary | ICD-10-CM

## 2022-04-11 PROCEDURE — 99214 OFFICE O/P EST MOD 30 MIN: CPT | Performed by: PSYCHIATRY & NEUROLOGY

## 2022-04-11 RX ORDER — CILOSTAZOL 100 MG/1
100 TABLET ORAL 2 TIMES DAILY
COMMUNITY
Start: 2022-01-26 | End: 2023-04-11

## 2022-04-11 RX ORDER — PREGABALIN 100 MG/1
100 CAPSULE ORAL 3 TIMES DAILY
Qty: 90 CAPSULE | Refills: 5 | Status: SHIPPED | OUTPATIENT
Start: 2022-04-11 | End: 2022-08-29

## 2022-04-11 RX ORDER — DULOXETIN HYDROCHLORIDE 20 MG/1
20 CAPSULE, DELAYED RELEASE ORAL DAILY
COMMUNITY
Start: 2022-03-30

## 2022-04-11 NOTE — PROGRESS NOTES
NEUROLOGY FOLLOW UP VISIT  NOTE       [facility] [place]  [address] [place] [address]  Tel: [phone]  Fax: [phone]  [email or web address]     Hardeep Silvestre,  1955, MRN [id number]  PCP: Clint Gaona  Date: 2022      ASSESSMENT & PLAN     Visit Diagnosis  Diabetic sensorimotor polyneuropathy (H)     Diabetic polyneuropathy  66 years old male with history of CAD, PAD, HTN, lumbar stenosis, s/p fusion who returns for follow-up for his diabetic polyneuropathy.  He was tried on gabapentin and Cymbalta in the past that did not seem to help.  During his last visit he was started on Lyrica and has noticed improvement.  I have recommended:    1.  Continue Lyrica but increase the dose to 100 mg 3 times daily  2.  Continue tramadol 1 tablet 3 times daily  3.  His primary care physician started him on Cymbalta as he had no drive and appeared depressed.  He feels it has helped.  4.  Follow-up will be in 1 year    Thank you again for this referral, please feel free to contact me if you have any questions.    Jamal Barahona MD  [facility]  (Former[facility], [facility]     HISTORY OF PRESENT ILLNESS     Patient is 66 years old male with history of CAD, PAD, HTN, lumbar stenosis, s/p fusion who was last seen on 1/10/2022 for diabetic polyneuropathy.  He did not find gabapentin or Cymbalta very effective and was told to taper himself off gabapentin and started on Lyrica 75 mg TID.  In addition he was told to continue tramadol 1 tablet 3 times daily.  My plan was if maximizing the dose of Lyrica is not helpful we can consider carbamazepine or Pristiq.  Additionally topical capsaicin might also be helpful.  In the past he was told to have a tighter glycemic control and also to stop drinking.  Since his last visit he reports there has been improvement in his symptoms.  He feels we are making some headway.  He is  wondering if he can increase the dose of Lyrica somewhat.  His primary care physician also started him on Cymbalta as he appeared depressed and feels there has been an improvement since he was started on simple    Patient's symptoms started more than 17 years ago.  Initially he had some back pain and was noted to have left L5 radiculopathy.  He also developed foot drop and had surgery with some improvement.  Due to his persistent symptoms he was seen at [facility] and had EMG that showed chronic left L5 radiculopathy but no compelling evidence of large fiber peripheral neuropathy.  He also had autonomic reflex screening at that time that raised the possibility of early small fiber neuropathy.  He was also seen by vascular surgery and had a stent placed and was told there is nothing much that can be done.  He was started on gabapentin that he discontinued it.  His symptoms got worse and he was seen by orthopedics and had lumbar fusion done but it did not result in any improvement in his symptoms.  In addition to paresthesias he also had impotence but denied other autonomic symptoms.  In 2017 he was evaluated in our clinic with similar symptoms and his hemoglobin A1c was 7.1, antinuclear antibody, SSA/SSB antibody, rheumatoid factor, Lyme titer were normal.  He had repeat lab work in 2021 that showed normal methylmalonic acid level, vitamin B1, vitamin B6, vitamin B12, folate, Lyme titer, angiotensin-converting enzyme, antinuclear antibody, SPEP and immunofixation.  Hemoglobin A1c was elevated at 7.4.  EMG repeated in 2021 showed fairly advanced length dependent mixed sensorimotor polyneuropathy     PROBLEM LIST   Patient Active Problem List   Diagnosis Code     Coronary artery disease involving native coronary artery of native heart without angina pectoris I25.10     Celiac disease K90.0     Type 2 diabetes mellitus with other specified complication, without long-term current use of insulin (H) E11.69     Essential  hypertension I10     Hyperlipidemia E78.5     Neurogenic claudication due to lumbar spinal stenosis M48.062     Peripheral arterial disease (H) I73.9     Diabetic sensorimotor polyneuropathy (H) E11.42         PAST MEDICAL & SURGICAL HISTORY     Past Medical History:   Patient  has a past medical history of Claudication (H), H/O heart artery stent (2003), Hyperlipidemia, Hypertension, MI (myocardial infarction) (H), PAD (peripheral artery disease) (H), and Type 2 diabetes mellitus with other specified complication, without long-term current use of insulin (H).    Surgical History:  He  has a past surgical history that includes BILATERAL LUMBAR 3-4, LUMBAR 4-5 TRANSFORAMINAL LUMBAR INTERBODY FUSION; CORONARY ANGIOPLASTY WITH STENT PLACEMENT; hernia repair; shoulder surgery; Angioplasty; and hernia repair.     SOCIAL HISTORY     Reviewed, and he  reports that he quit smoking about 19 years ago. His smoking use included cigarettes. He smoked 2.00 packs per day. He has never used smokeless tobacco. He reports current alcohol use. He reports previous drug use.     FAMILY HISTORY     Reviewed, and family history includes Coronary Artery Disease in his father; Diabetes in his mother; Heart Failure in his father; Liver Disease in his mother.     ALLERGIES     Allergies   Allergen Reactions     Macadamia Nut Oil Anaphylaxis     Atorvastatin Muscle Pain (Myalgia)         REVIEW OF SYSTEMS     A 12 point review of system was performed and was negative except as outlined in the history of present illness.     HOME MEDICATIONS     Current Outpatient Rx   Medication Sig Dispense Refill     acetaminophen (TYLENOL) 500 MG tablet Take 1,000 mg by mouth       aspirin (ASA) 81 MG EC tablet Take 81 mg by mouth       atenolol (TENORMIN) 25 MG tablet Take 25 mg by mouth       cilostazol (PLETAL) 100 MG tablet Take 100 mg by mouth in the morning and 100 mg in the evening.       DULoxetine (CYMBALTA) 20 MG capsule Take 20 mg by mouth in  "the morning.       losartan (COZAAR) 50 MG tablet Take 50 mg by mouth       Melatonin 10 MG TABS tablet Take 10 mg by mouth nightly as needed for sleep       metFORMIN (GLUCOPHAGE-XR) 500 MG 24 hr tablet Take 1,000 mg by mouth       multivitamin, therapeutic with minerals (THERA-VIT-M) TABS tablet Take 1 tablet by mouth       omeprazole (PRILOSEC) 20 MG DR capsule Take 20 mg by mouth       pregabalin (LYRICA) 100 MG capsule Take 1 capsule (100 mg) by mouth in the morning and 1 capsule (100 mg) at noon and 1 capsule (100 mg) in the evening. 90 capsule 5     rosuvastatin (CRESTOR) 20 MG tablet Take 20 mg by mouth       traMADol (ULTRAM) 50 MG tablet Take 1 tablet (50 mg) by mouth every 8 hours as needed for severe pain 90 tablet 5     Ibuprofen-diphenhydrAMINE Cit (ADVIL PM PO)        nitroGLYcerin (NITROSTAT) 0.4 MG sublingual tablet Place 0.4 mg under the tongue           PHYSICAL EXAM     Vital signs  BP (!) 150/79 (BP Location: Left arm, Patient Position: Sitting)   Pulse 76   Ht 1.778 m (5' 10\")   Wt 86.6 kg (191 lb)   BMI 27.41 kg/m      Weight:   191 lbs 0 oz    Patient is alert and oriented x4 in no acute distress. Vital signs were reviewed and are documented in electronic medical record. Neck was supple, no carotid bruits, thyromegaly, JVD, or lymphadenopathy was noted.   NEUROLOGY EXAM:    Patient s speech was normal with no aphasia or dysarthria. Mentation, and affect were also normal.     Funduscopic exam was normal, with normal cup to disc ratio. Cranial nerves II -XII were intact.     Patient had normal mass, tone and motor strength was 5/5 in all extremities without pronator drift.     Sensation was decreased to light touch pinprick vibratory and temperature sensation below knees    Reflexes were 2+ in the upper extremity 1+ at knees absent at ankles    No dysmetria noted on FNF or HKS. Romberg was negative.    Gait testing was normal. Able to walk on toes/heels. Tandem walk normal.     PERTINENT " DIAGNOSTIC STUDIES     Following studies were reviewed:     MRI LUMBAR SPINE 1/25/2021  1.  Unchanged mild L4-L5 degenerative disc disease with an unchanged small diffuse posterior disc bulge and previous postoperative changes of her treatment compressive laminectomy.  There is a small diffuse posterior disc bulge contributes to a mild midline spinal canal stenosis with mild narrowing of the right lateral recess, where there is redemonstrated slight contact of the traversing right L5 nerve root  2.  Unchanged moderate midline L3-L4 spinal canal stenosis with slight narrowing of the right lateral recess and mild narrowing of the left L3-L4 lateral recess.  3.  Unchanged moderate bilateral L3-L4 and moderate left L4-L5 neuroforaminal stenosis  4.  Moderate bilateral L3-L4 facet arthropathy with interval development of small facet joint effusion.  This is accompanied by a small amount of adjacent degenerative osseous edema  5.  Additional severe bilateral L4-L5 facet arthropathy with unchanged 2 mm degenerative anterolisthesis of L3 on L4 and L4 and L5  6.  Relatively short pedicles in the lower lumbar spine contribute to a diffuse congenital spinal canal narrowing     MRI LUMBAR SPINE 3/9/2017  Multilevel degenerative lumbar spondylosis, a  developmentally narrowed mid to lower lumbar spinal canal, interval  bilateral decompressive laminectomies at L4-5 and the following  notable findings:  1.  Decreased size of 2 to 3 mm mildly cranially directed L4-5 right  paracentral disc protrusion with mild central stenosis, but no  traversing neural compression.  2.  Unchanged chronic moderate L3-4 and mild L2-3 central stenosis  without traversing neural compression.  3.  No acute fracture, spondylolysis or evidence of arachnoidal  adhesive disease.  4.  Chronic mild right/mild to moderate left L4-5 and mild bilateral  L3-4 foraminal stenosis without ganglionic compression.  5.  Mild bilateral L5-S1, moderate right/mild to  moderate left L4-5  and moderate left/mild to moderate right L3-4 facet arthrosis.     AUTONOMIC REFLEX SCREEN 7/6/2017  There is evidence of focal postganglionic sudomotor dysfunction while cardiovagal and adrenergic functions are preserved. Local factors could be responsible for the result, but an early small fiber neuropathy cannot be excluded.     EMG 7/21/2021  This is a abnormal nerve conduction and EMG study of lower extremity that suggests length dependent mixed sensorimotor predominantly axonal polyneuropathy.     EMG 7/5/2017  Abnormal study. There is electrophysiologic evidence of a chronic,   inactive, left L5 lumbar radiculopathy. There is no compelling evidence of large fiber peripheral neuropathy.     PERTINENT LABS  Following labs were reviewed:  No visits with results within 3 Month(s) from this visit.   Latest known visit with results is:   Ambulatory - [facility] on 07/06/2021   Component Date Value     Hemoglobin A1C 07/06/2021 7.4 (A)     Albumin % 07/06/2021 60.0      Albumin 07/06/2021 4.3      Alpha 1 % 07/06/2021 2.9      Alpha 1 07/06/2021 0.2      Alpha 2 % 07/06/2021 11.0      Alpha 2 07/06/2021 0.8      Beta % 07/06/2021 13.7      Beta Globulin 07/06/2021 1.0      Gamma Globulin % 07/06/2021 12.4      Gamma Globulin 07/06/2021 0.9      ELP Interpretation 07/06/2021 Normal serum protein electrophoresis.      Protein Total 07/06/2021 7.2      Path ICD: 07/06/2021 G62.9      Reviewing Pathologist 07/06/2021 Gerald Self MD       Immunofixation ELP 07/06/2021 No monoclonal component identified.      Path ICD: 07/06/2021 G62.9      Reviewing Pathologist 07/06/2021 Gerald Self MD       Antinuclear Antibodies (* 07/06/2021 0.9      Angiotensin Converting E* 07/06/2021 41      Lyme Disease Antibodies * 07/06/2021 0.06      Folic Acid 07/06/2021 14.7      Vitamin B6 07/06/2021 81.0      Vitamin B1 Whole Blood L* 07/06/2021 150      Vitamin B12 07/06/2021 398      Methylmalonic Acid  07/06/2021 See Scanned Result          Total time spent for face to face visit, reviewing labs/imaging studies, counseling and coordination of care was: 30 Minutes spent on the date of the encounter doing chart review, review of outside records, review of test results, interpretation of tests, patient visit and documentation     This note was dictated using voice recognition software.  Any grammatical or context distortions are unintentional and inherent to the software.    No orders of the defined types were placed in this encounter.     New Prescriptions    No medications on file     Modified Medications    Modified Medication Previous Medication    PREGABALIN (LYRICA) 100 MG CAPSULE pregabalin (LYRICA) 75 MG capsule       Take 1 capsule (100 mg) by mouth in the morning and 1 capsule (100 mg) at noon and 1 capsule (100 mg) in the evening.    Take 1 capsule (75 mg) by mouth 3 times daily

## 2022-04-11 NOTE — LETTER
2022         RE: Hardeep Silvestre  [address] [place] [id number]        Dear Colleague,    Thank you for referring your patient, Hardeep Silvestre, to the [facility] [place]. Please see a copy of my visit note below.    NEUROLOGY FOLLOW UP VISIT  NOTE       [facility] [place]  [address] [place] [address]  Tel: [phone]  Fax: [phone]  [email or web address]     Hardeep Silvestre,  1955, MRN [id number]  PCP: Clint Gaona  Date: 2022      ASSESSMENT & PLAN     Visit Diagnosis  1. Diabetic sensorimotor polyneuropathy (H)     Diabetic polyneuropathy  66 years old male with history of CAD, PAD, HTN, lumbar stenosis, s/p fusion who returns for follow-up for his diabetic polyneuropathy.  He was tried on gabapentin and Cymbalta in the past that did not seem to help.  During his last visit he was started on Lyrica and has noticed improvement.  I have recommended:    1.  Continue Lyrica but increase the dose to 100 mg 3 times daily  2.  Continue tramadol 1 tablet 3 times daily  3.  His primary care physician started him on Cymbalta as he had no drive and appeared depressed.  He feels it has helped.  4.  Follow-up will be in 1 year    Thank you again for this referral, please feel free to contact me if you have any questions.    Jamal Barahona MD  [facility]  (Formerly, [facility]     HISTORY OF PRESENT ILLNESS     Patient is 66 years old male with history of CAD, PAD, HTN, lumbar stenosis, s/p fusion who was last seen on 1/10/2022 for diabetic polyneuropathy.  He did not find gabapentin or Cymbalta very effective and was told to taper himself off gabapentin and started on Lyrica 75 mg TID.  In addition he was told to continue tramadol 1 tablet 3 times daily.  My plan was if maximizing the dose of Lyrica is not helpful we can consider carbamazepine or  Pristiq.  Additionally topical capsaicin might also be helpful.  In the past he was told to have a tighter glycemic control and also to stop drinking.  Since his last visit he reports there has been improvement in his symptoms.  He feels we are making some headway.  He is wondering if he can increase the dose of Lyrica somewhat.  His primary care physician also started him on Cymbalta as he appeared depressed and feels there has been an improvement since he was started on simple    Patient's symptoms started more than 17 years ago.  Initially he had some back pain and was noted to have left L5 radiculopathy.  He also developed foot drop and had surgery with some improvement.  Due to his persistent symptoms he was seen at [facility] and had EMG that showed chronic left L5 radiculopathy but no compelling evidence of large fiber peripheral neuropathy.  He also had autonomic reflex screening at that time that raised the possibility of early small fiber neuropathy.  He was also seen by vascular surgery and had a stent placed and was told there is nothing much that can be done.  He was started on gabapentin that he discontinued it.  His symptoms got worse and he was seen by orthopedics and had lumbar fusion done but it did not result in any improvement in his symptoms.  In addition to paresthesias he also had impotence but denied other autonomic symptoms.  In 2017 he was evaluated in our clinic with similar symptoms and his hemoglobin A1c was 7.1, antinuclear antibody, SSA/SSB antibody, rheumatoid factor, Lyme titer were normal.  He had repeat lab work in 2021 that showed normal methylmalonic acid level, vitamin B1, vitamin B6, vitamin B12, folate, Lyme titer, angiotensin-converting enzyme, antinuclear antibody, SPEP and immunofixation.  Hemoglobin A1c was elevated at 7.4.  EMG repeated in 2021 showed fairly advanced length dependent mixed sensorimotor polyneuropathy     PROBLEM LIST   Patient Active Problem List    Diagnosis Code     Coronary artery disease involving native coronary artery of native heart without angina pectoris I25.10     Celiac disease K90.0     Type 2 diabetes mellitus with other specified complication, without long-term current use of insulin (H) E11.69     Essential hypertension I10     Hyperlipidemia E78.5     Neurogenic claudication due to lumbar spinal stenosis M48.062     Peripheral arterial disease (H) I73.9     Diabetic sensorimotor polyneuropathy (H) E11.42         PAST MEDICAL & SURGICAL HISTORY     Past Medical History:   Patient  has a past medical history of Claudication (H), H/O heart artery stent (2003), Hyperlipidemia, Hypertension, MI (myocardial infarction) (H), PAD (peripheral artery disease) (H), and Type 2 diabetes mellitus with other specified complication, without long-term current use of insulin (H).    Surgical History:  He  has a past surgical history that includes BILATERAL LUMBAR 3-4, LUMBAR 4-5 TRANSFORAMINAL LUMBAR INTERBODY FUSION; CORONARY ANGIOPLASTY WITH STENT PLACEMENT; hernia repair; shoulder surgery; Angioplasty; and hernia repair.     SOCIAL HISTORY     Reviewed, and he  reports that he quit smoking about 19 years ago. His smoking use included cigarettes. He smoked 2.00 packs per day. He has never used smokeless tobacco. He reports current alcohol use. He reports previous drug use.     FAMILY HISTORY     Reviewed, and family history includes Coronary Artery Disease in his father; Diabetes in his mother; Heart Failure in his father; Liver Disease in his mother.     ALLERGIES     Allergies   Allergen Reactions     Macadamia Nut Oil Anaphylaxis     Atorvastatin Muscle Pain (Myalgia)         REVIEW OF SYSTEMS     A 12 point review of system was performed and was negative except as outlined in the history of present illness.     HOME MEDICATIONS     Current Outpatient Rx   Medication Sig Dispense Refill     acetaminophen (TYLENOL) 500 MG tablet Take 1,000 mg by mouth        "aspirin (ASA) 81 MG EC tablet Take 81 mg by mouth       atenolol (TENORMIN) 25 MG tablet Take 25 mg by mouth       cilostazol (PLETAL) 100 MG tablet Take 100 mg by mouth in the morning and 100 mg in the evening.       DULoxetine (CYMBALTA) 20 MG capsule Take 20 mg by mouth in the morning.       losartan (COZAAR) 50 MG tablet Take 50 mg by mouth       Melatonin 10 MG TABS tablet Take 10 mg by mouth nightly as needed for sleep       metFORMIN (GLUCOPHAGE-XR) 500 MG 24 hr tablet Take 1,000 mg by mouth       multivitamin, therapeutic with minerals (THERA-VIT-M) TABS tablet Take 1 tablet by mouth       omeprazole (PRILOSEC) 20 MG DR capsule Take 20 mg by mouth       pregabalin (LYRICA) 100 MG capsule Take 1 capsule (100 mg) by mouth in the morning and 1 capsule (100 mg) at noon and 1 capsule (100 mg) in the evening. 90 capsule 5     rosuvastatin (CRESTOR) 20 MG tablet Take 20 mg by mouth       traMADol (ULTRAM) 50 MG tablet Take 1 tablet (50 mg) by mouth every 8 hours as needed for severe pain 90 tablet 5     Ibuprofen-diphenhydrAMINE Cit (ADVIL PM PO)        nitroGLYcerin (NITROSTAT) 0.4 MG sublingual tablet Place 0.4 mg under the tongue           PHYSICAL EXAM     Vital signs  BP (!) 150/79 (BP Location: Left arm, Patient Position: Sitting)   Pulse 76   Ht 1.778 m (5' 10\")   Wt 86.6 kg (191 lb)   BMI 27.41 kg/m      Weight:   191 lbs 0 oz    Patient is alert and oriented x4 in no acute distress. Vital signs were reviewed and are documented in electronic medical record. Neck was supple, no carotid bruits, thyromegaly, JVD, or lymphadenopathy was noted.   NEUROLOGY EXAM:    Patient s speech was normal with no aphasia or dysarthria. Mentation, and affect were also normal.     Funduscopic exam was normal, with normal cup to disc ratio. Cranial nerves II -XII were intact.     Patient had normal mass, tone and motor strength was 5/5 in all extremities without pronator drift.     Sensation was decreased to light touch " pinprick vibratory and temperature sensation below knees    Reflexes were 2+ in the upper extremity 1+ at knees absent at ankles    No dysmetria noted on FNF or HKS. Romberg was negative.    Gait testing was normal. Able to walk on toes/heels. Tandem walk normal.     PERTINENT DIAGNOSTIC STUDIES     Following studies were reviewed:     MRI LUMBAR SPINE 1/25/2021  1.  Unchanged mild L4-L5 degenerative disc disease with an unchanged small diffuse posterior disc bulge and previous postoperative changes of her treatment compressive laminectomy.  There is a small diffuse posterior disc bulge contributes to a mild midline spinal canal stenosis with mild narrowing of the right lateral recess, where there is redemonstrated slight contact of the traversing right L5 nerve root  2.  Unchanged moderate midline L3-L4 spinal canal stenosis with slight narrowing of the right lateral recess and mild narrowing of the left L3-L4 lateral recess.  3.  Unchanged moderate bilateral L3-L4 and moderate left L4-L5 neuroforaminal stenosis  4.  Moderate bilateral L3-L4 facet arthropathy with interval development of small facet joint effusion.  This is accompanied by a small amount of adjacent degenerative osseous edema  5.  Additional severe bilateral L4-L5 facet arthropathy with unchanged 2 mm degenerative anterolisthesis of L3 on L4 and L4 and L5  6.  Relatively short pedicles in the lower lumbar spine contribute to a diffuse congenital spinal canal narrowing     MRI LUMBAR SPINE 3/9/2017  Multilevel degenerative lumbar spondylosis, a  developmentally narrowed mid to lower lumbar spinal canal, interval  bilateral decompressive laminectomies at L4-5 and the following  notable findings:  1.  Decreased size of 2 to 3 mm mildly cranially directed L4-5 right  paracentral disc protrusion with mild central stenosis, but no  traversing neural compression.  2.  Unchanged chronic moderate L3-4 and mild L2-3 central stenosis  without traversing neural  compression.  3.  No acute fracture, spondylolysis or evidence of arachnoidal  adhesive disease.  4.  Chronic mild right/mild to moderate left L4-5 and mild bilateral  L3-4 foraminal stenosis without ganglionic compression.  5.  Mild bilateral L5-S1, moderate right/mild to moderate left L4-5  and moderate left/mild to moderate right L3-4 facet arthrosis.     AUTONOMIC REFLEX SCREEN 7/6/2017  There is evidence of focal postganglionic sudomotor dysfunction while cardiovagal and adrenergic functions are preserved. Local factors could be responsible for the result, but an early small fiber neuropathy cannot be excluded.     EMG 7/21/2021  This is a abnormal nerve conduction and EMG study of lower extremity that suggests length dependent mixed sensorimotor predominantly axonal polyneuropathy.     EMG 7/5/2017  Abnormal study. There is electrophysiologic evidence of a chronic,   inactive, left L5 lumbar radiculopathy. There is no compelling evidence of large fiber peripheral neuropathy.     PERTINENT LABS  Following labs were reviewed:  No visits with results within 3 Month(s) from this visit.   Latest known visit with results is:   Ambulatory - [facility] on 07/06/2021   Component Date Value     Hemoglobin A1C 07/06/2021 7.4 (A)     Albumin % 07/06/2021 60.0      Albumin 07/06/2021 4.3      Alpha 1 % 07/06/2021 2.9      Alpha 1 07/06/2021 0.2      Alpha 2 % 07/06/2021 11.0      Alpha 2 07/06/2021 0.8      Beta % 07/06/2021 13.7      Beta Globulin 07/06/2021 1.0      Gamma Globulin % 07/06/2021 12.4      Gamma Globulin 07/06/2021 0.9      ELP Interpretation 07/06/2021 Normal serum protein electrophoresis.      Protein Total 07/06/2021 7.2      Path ICD: 07/06/2021 G62.9      Reviewing Pathologist 07/06/2021 Gerald Self MD       Immunofixation ELP 07/06/2021 No monoclonal component identified.      Path ICD: 07/06/2021 G62.9      Reviewing Pathologist 07/06/2021 Gerald Self MD       Antinuclear Antibodies  (* 07/06/2021 0.9      Angiotensin Converting E* 07/06/2021 41      Lyme Disease Antibodies * 07/06/2021 0.06      Folic Acid 07/06/2021 14.7      Vitamin B6 07/06/2021 81.0      Vitamin B1 Whole Blood L* 07/06/2021 150      Vitamin B12 07/06/2021 398      Methylmalonic Acid 07/06/2021 See Scanned Result          Total time spent for face to face visit, reviewing labs/imaging studies, counseling and coordination of care was: 30 Minutes spent on the date of the encounter doing chart review, review of outside records, review of test results, interpretation of tests, patient visit and documentation       This note was dictated using voice recognition software.  Any grammatical or context distortions are unintentional and inherent to the software.    No orders of the defined types were placed in this encounter.     New Prescriptions    No medications on file     Modified Medications    Modified Medication Previous Medication    PREGABALIN (LYRICA) 100 MG CAPSULE pregabalin (LYRICA) 75 MG capsule       Take 1 capsule (100 mg) by mouth in the morning and 1 capsule (100 mg) at noon and 1 capsule (100 mg) in the evening.    Take 1 capsule (75 mg) by mouth 3 times daily                     Again, thank you for allowing me to participate in the care of your patient.        Sincerely,        Jamal Barahona MD

## 2022-04-11 NOTE — NURSING NOTE
Chief Complaint   Patient presents with     Diabetic polyneuropathy     3 month follow up- Taking Lyrica 75mg TID and patient reports improvement in sx     Kisha Paz CMA on 4/11/2022 at 11:54 AM

## 2022-07-12 DIAGNOSIS — G62.9 PERIPHERAL POLYNEUROPATHY: ICD-10-CM

## 2022-07-12 RX ORDER — TRAMADOL HYDROCHLORIDE 50 MG/1
50 TABLET ORAL EVERY 8 HOURS PRN
Qty: 90 TABLET | Refills: 5 | Status: SHIPPED | OUTPATIENT
Start: 2022-07-12 | End: 2023-02-08

## 2022-07-12 NOTE — TELEPHONE ENCOUNTER
[facility] Call Center    Phone Message    May a detailed message be left on voicemail: yes     Reason for Call: Medication Refill Request    Has the patient contacted the pharmacy for the refill? Yes   Name of medication being requested: traMADol (ULTRAM) 50 MG tablet  Provider who prescribed the medication: Jamal Barahona MD  Pharmacy: [facility] #[id number] [address] AT [facility] [address]  Date medication is needed: ASAP  Pt is currently out of medication.      Action Taken: Other: neurology    Travel Screening: Not Applicable

## 2022-07-12 NOTE — TELEPHONE ENCOUNTER
Refill request for Tramadol. Pt last seen 4/11/22 and is not due for follow up until 4/2023.     Medication T'd for review and signature    Teresa Carlos RN on 7/12/2022 at 9:19 AM

## 2022-07-23 ENCOUNTER — HEALTH MAINTENANCE LETTER (OUTPATIENT)
Age: 67
End: 2022-07-23

## 2022-08-29 ENCOUNTER — TELEPHONE (OUTPATIENT)
Dept: NEUROLOGY | Facility: CLINIC | Age: 67
End: 2022-08-29

## 2022-08-29 DIAGNOSIS — E11.42 DIABETIC SENSORIMOTOR POLYNEUROPATHY (H): ICD-10-CM

## 2022-08-29 RX ORDER — PREGABALIN 150 MG/1
150 CAPSULE ORAL 3 TIMES DAILY
Qty: 90 CAPSULE | Refills: 5 | Status: SHIPPED | OUTPATIENT
Start: 2022-08-29 | End: 2023-03-14

## 2022-08-29 NOTE — TELEPHONE ENCOUNTER
[facility] Call Center    Phone Message    May a detailed message be left on voicemail: yes     Reason for Call: Medication Question or concern regarding medication   Prescription Clarification  Name of Medication: pregabalin (LYRICA) 100 MG capsule  Prescribing Provider: Jamal Barahona MD   Pharmacy: [facility] #[id number] [facility] [address] AT [facility] [address]    What on the order needs clarification? Pt is calling because he would like to up the dosage of his Lyrica. He just recently had a procedure done which allows him to walk on his feet more. Since pt has been on his feet a lot more, he feels the burning in feet due to neuropathy. So he would like Dr. Barahona to up the Lyrica and send a new Rx to his pharmacy. Please contact pt to discuss.      Action Taken: Message routed to:  [facility]    Travel Screening: Not Applicable

## 2022-08-29 NOTE — TELEPHONE ENCOUNTER
Patient currently taking Lyrica 100mg TID and would like to increase his dose due to increased walking causing discomforf

## 2022-10-01 ENCOUNTER — HEALTH MAINTENANCE LETTER (OUTPATIENT)
Age: 67
End: 2022-10-01

## 2023-01-30 ENCOUNTER — TELEPHONE (OUTPATIENT)
Dept: NEUROLOGY | Facility: CLINIC | Age: 68
End: 2023-01-30
Payer: COMMERCIAL

## 2023-01-30 NOTE — TELEPHONE ENCOUNTER
[facility]    Phone Message    May a detailed message be left on voicemail: yes     Reason for Call: Medication Question or concern regarding medication   Prescription Clarification  Name of Medication: pregabalin (LYRICA) 150 MG capsule & pregabalin (LYRICA) 150 MG capsule  Prescribing Provider: Dr. Barahona   Pharmacy: [email or web address]  [email or web address]  [address] [place] [address]   [phone]   What on the order needs clarification? Pt call to request that his pharmacy be swtiched to the [facility] in [place].    Please call Pt back at [phone] with any questions          Action Taken: Message routed to:  Other: [facility]    Travel Screening: Not Applicable

## 2023-02-08 DIAGNOSIS — G62.9 PERIPHERAL POLYNEUROPATHY: ICD-10-CM

## 2023-02-08 RX ORDER — TRAMADOL HYDROCHLORIDE 50 MG/1
50 TABLET ORAL EVERY 8 HOURS PRN
Qty: 90 TABLET | Refills: 1 | Status: SHIPPED | OUTPATIENT
Start: 2023-02-08 | End: 2023-04-12

## 2023-02-08 NOTE — TELEPHONE ENCOUNTER
[facility]    Phone Message    May a detailed message be left on voicemail: yes     Reason for Call: Medication Refill Request    Has the patient contacted the pharmacy for the refill? Yes   Name of medication being requested: traMADol (ULTRAM) 50 MG tablet  Provider who prescribed the medication: Dr. Barahona  Pharmacy:     [facility]:  [address] [address]  Phone: [phone]    Date medication is needed: 2/13/23    Pt requests 90 supply as this will save him money with insurance.       Action Taken: Message routed to:  Other: [facility]    Travel Screening: Not Applicable

## 2023-02-08 NOTE — TELEPHONE ENCOUNTER
Refill request for:  traMADol (ULTRAM) 50 MG tablet  Directions: Take 1 tablet (50 mg) by mouth every 8 hours as needed for severe pain    LOV: 4/11/22  NOV: 4/12/23    #90 with 1 refill Medication T'd for review and signature  Kisha Paz CMA on 2/8/2023 at 3:15 PM

## 2023-03-11 DIAGNOSIS — E11.42 DIABETIC SENSORIMOTOR POLYNEUROPATHY (H): ICD-10-CM

## 2023-03-14 RX ORDER — PREGABALIN 150 MG/1
CAPSULE ORAL
Qty: 90 CAPSULE | Refills: 1 | Status: SHIPPED | OUTPATIENT
Start: 2023-03-14 | End: 2023-04-12

## 2023-03-14 NOTE — TELEPHONE ENCOUNTER
Refill request for: pregabalin (LYRICA) 150 MG capsule  Directions: Take 1 capsule (150 mg) by mouth 3 times daily     LOV: 4/11/22  NOV: 4/12/23    30 day supply with 1 refills Medication T'd for review and signature  Kisha Paz CMA on 3/14/2023 at 10:29 AM

## 2023-04-11 NOTE — PROGRESS NOTES
NEUROLOGY FOLLOW UP VISIT  NOTE       [facility] NEUROLOGY [place]  [address] [place] [address]  Tel: [phone]  Fax: [phone]  [email or web address]     Hardeep Silvestre,  1955, MRN [id number]  PCP: Clint Gaona  Date: 2023      ASSESSMENT & PLAN     Visit Diagnosis  Neurogenic claudication due to lumbar spinal stenosis  Diabetic sensorimotor polyneuropathy (H)     Diabetic sensorimotor polyneuropathy  67-year-old male with history of CAD, PAD, HTN, lumbar stenosis s/p fusion who returns for follow-up for diabetic polyneuropathy.  Previously was tried on gabapentin that resulted in increased sedation but Lyrica and tramadol has been helpful.  I have recommended:    1.  Increase Lyrica to 200 mg 3 times daily.  Prescriptions are filled  2.  Use tramadol 1 tablet daily as needed  3.  I have informed patient we will not be able to completely get rid of his symptoms and that he will need to learn to live with some discomfort  4.  Follow-up will be in 1 year    Thank you again for this referral, please feel free to contact me if you have any questions.    Jamal Barahona MD  [facility]  (Former[facility], [facility]     HISTORY OF PRESENT ILLNESS     Patient is a 67-year-old male with history of CAD, PAD, HTN, lumbar stenosis s/p fusion last seen on 2022 who returns for follow-up for his diabetic polyneuropathy.  Previously was tried on gabapentin and Cymbalta that did not help but during his last visit he reported improvement with Lyrica.  Dose was increased to 100 mg 3 times daily in addition to tramadol 1 tablet 3 times daily.  Dr. Gaona had also started him on Cymbalta that he feels helped.  Patient feels current dose of Lyrica is helping.  Occasionally he will take tramadol also.  He still has feeling of swelling in his leg and try to get some over-the-counter cream that did not seem to help.    Patient's  symptoms started more than 17 years ago.  Initially he had some back pain and was noted to have left L5 radiculopathy.  He also developed foot drop and had surgery with some improvement.  Due to his persistent symptoms he was seen at [facility] and had EMG that showed chronic left L5 radiculopathy but no compelling evidence of large fiber peripheral neuropathy.  He also had autonomic reflex screening at that time that raised the possibility of early small fiber neuropathy.  He was also seen by vascular surgery and had a stent placed and was told there is nothing much that can be done.  He was started on gabapentin that he discontinued it.  His symptoms got worse and he was seen by orthopedics and had lumbar fusion done but it did not result in any improvement in his symptoms.  In addition to paresthesias he also had impotence but denied other autonomic symptoms.  In 2017 he was evaluated in our clinic with similar symptoms and his hemoglobin A1c was 7.1, antinuclear antibody, SSA/SSB antibody, rheumatoid factor, Lyme titer were normal.  He had repeat lab work in 2021 that showed normal methylmalonic acid level, vitamin B1, vitamin B6, vitamin B12, folate, Lyme titer, angiotensin-converting enzyme, antinuclear antibody, SPEP and immunofixation.  Hemoglobin A1c was elevated at 7.4.  EMG repeated in 2021 showed fairly advanced length dependent mixed sensorimotor polyneuropathy     PROBLEM LIST   Patient Active Problem List   Diagnosis Code     Coronary artery disease involving native coronary artery of native heart without angina pectoris I25.10     Celiac disease K90.0     Type 2 diabetes mellitus with other specified complication, without long-term current use of insulin (H) E11.69     Essential hypertension I10     Hyperlipidemia E78.5     Neurogenic claudication due to lumbar spinal stenosis M48.062     Peripheral arterial disease (H) I73.9     Diabetic sensorimotor polyneuropathy (H) E11.42     Depression,  recurrent (H) F33.9         PAST MEDICAL & SURGICAL HISTORY     Past Medical History:   Patient  has a past medical history of Claudication (H), H/O heart artery stent (2003), Hyperlipidemia, Hypertension, MI (myocardial infarction) (H), PAD (peripheral artery disease) (H), and Type 2 diabetes mellitus with other specified complication, without long-term current use of insulin (H).    Surgical History:  He  has a past surgical history that includes BILATERAL LUMBAR 3-4, LUMBAR 4-5 TRANSFORAMINAL LUMBAR INTERBODY FUSION; CORONARY ANGIOPLASTY WITH STENT PLACEMENT; hernia repair; shoulder surgery; Angioplasty; and hernia repair.     SOCIAL HISTORY     Reviewed, and he  reports that he quit smoking about 20 years ago. His smoking use included cigarettes. He smoked an average of 2 packs per day. He has never used smokeless tobacco. He reports current alcohol use. He reports that he does not currently use drugs.     FAMILY HISTORY     Reviewed, and family history includes Coronary Artery Disease in his father; Diabetes in his mother; Heart Failure in his father; Liver Disease in his mother.     ALLERGIES     Allergies   Allergen Reactions     Macadamia Nut Oil Anaphylaxis     Atorvastatin Muscle Pain (Myalgia)         REVIEW OF SYSTEMS     A 12 point review of system was performed and was negative except as outlined in the history of present illness.     HOME MEDICATIONS     Current Outpatient Rx   Medication Sig Dispense Refill     pregabalin (LYRICA) 200 MG capsule Take 1 capsule (200 mg) by mouth 3 times daily 90 capsule 5     traMADol (ULTRAM) 50 MG tablet Take 1 tablet (50 mg) by mouth every 8 hours as needed for severe pain 90 tablet 1     acetaminophen (TYLENOL) 500 MG tablet Take 1,000 mg by mouth       aspirin (ASA) 81 MG EC tablet Take 81 mg by mouth       atenolol (TENORMIN) 25 MG tablet Take 25 mg by mouth       DULoxetine (CYMBALTA) 20 MG capsule Take 20 mg by mouth in the morning.        "Ibuprofen-diphenhydrAMINE Cit (ADVIL PM PO)        losartan (COZAAR) 50 MG tablet Take 50 mg by mouth       Melatonin 10 MG TABS tablet Take 10 mg by mouth nightly as needed for sleep       metFORMIN (GLUCOPHAGE-XR) 500 MG 24 hr tablet Take 1,000 mg by mouth       multivitamin, therapeutic with minerals (THERA-VIT-M) TABS tablet Take 1 tablet by mouth       nitroGLYcerin (NITROSTAT) 0.4 MG sublingual tablet Place 0.4 mg under the tongue       omeprazole (PRILOSEC) 20 MG DR capsule Take 20 mg by mouth       rosuvastatin (CRESTOR) 20 MG tablet Take 20 mg by mouth           PHYSICAL EXAM     Vital signs  BP (!) 148/81   Pulse 63   Ht 1.778 m (5' 10\")   Wt 86 kg (189 lb 11.2 oz)   BMI 27.22 kg/m      Weight:   189 lbs 11.2 oz    Patient is alert and oriented x4 in no acute distress. Vital signs were reviewed and are documented in electronic medical record. Neck was supple, no carotid bruits, thyromegaly, JVD, or lymphadenopathy was noted.   NEUROLOGY EXAM:    Patient s speech was normal with no aphasia or dysarthria. Mentation, and affect were also normal.     Funduscopic exam was normal, with normal cup to disc ratio. Cranial nerves II -XII were intact.     Patient had normal mass, tone and motor strength was 5/5 in all extremities without pronator drift.     Sensation was decreased to light touch pinprick vibratory and temperature sensation below knees    Reflexes were 2+ in the upper extremity 1+ at knees absent at ankles    No dysmetria noted on FNF or HKS. Romberg was negative.    Gait testing was normal. Able to walk on toes/heels. Tandem walk normal.           PERTINENT DIAGNOSTIC STUDIES     Following studies were reviewed:     MRI LUMBAR SPINE 1/25/2021  1.  Unchanged mild L4-L5 degenerative disc disease with an unchanged small diffuse posterior disc bulge and previous postoperative changes of her treatment compressive laminectomy.  There is a small diffuse posterior disc bulge contributes to a mild midline " spinal canal stenosis with mild narrowing of the right lateral recess, where there is redemonstrated slight contact of the traversing right L5 nerve root  2.  Unchanged moderate midline L3-L4 spinal canal stenosis with slight narrowing of the right lateral recess and mild narrowing of the left L3-L4 lateral recess.  3.  Unchanged moderate bilateral L3-L4 and moderate left L4-L5 neuroforaminal stenosis  4.  Moderate bilateral L3-L4 facet arthropathy with interval development of small facet joint effusion.  This is accompanied by a small amount of adjacent degenerative osseous edema  5.  Additional severe bilateral L4-L5 facet arthropathy with unchanged 2 mm degenerative anterolisthesis of L3 on L4 and L4 and L5  6.  Relatively short pedicles in the lower lumbar spine contribute to a diffuse congenital spinal canal narrowing     MRI LUMBAR SPINE 3/9/2017  Multilevel degenerative lumbar spondylosis, a  developmentally narrowed mid to lower lumbar spinal canal, interval  bilateral decompressive laminectomies at L4-5 and the following  notable findings:  1.  Decreased size of 2 to 3 mm mildly cranially directed L4-5 right  paracentral disc protrusion with mild central stenosis, but no  traversing neural compression.  2.  Unchanged chronic moderate L3-4 and mild L2-3 central stenosis  without traversing neural compression.  3.  No acute fracture, spondylolysis or evidence of arachnoidal  adhesive disease.  4.  Chronic mild right/mild to moderate left L4-5 and mild bilateral  L3-4 foraminal stenosis without ganglionic compression.  5.  Mild bilateral L5-S1, moderate right/mild to moderate left L4-5  and moderate left/mild to moderate right L3-4 facet arthrosis.     AUTONOMIC REFLEX SCREEN 7/6/2017  There is evidence of focal postganglionic sudomotor dysfunction while cardiovagal and adrenergic functions are preserved. Local factors could be responsible for the result, but an early small fiber neuropathy cannot be  excluded.     EMG 7/21/2021  This is a abnormal nerve conduction and EMG study of lower extremity that suggests length dependent mixed sensorimotor predominantly axonal polyneuropathy.     EMG 7/5/2017  Abnormal study. There is electrophysiologic evidence of a chronic,   inactive, left L5 lumbar radiculopathy. There is no compelling evidence of large fiber peripheral neuropathy          PERTINENT LABS  Following labs were reviewed:  No visits with results within 3 Month(s) from this visit.   Latest known visit with results is:   Ambulatory - [facility] on 07/06/2021   Component Date Value     Hemoglobin A1C 07/06/2021 7.4 (H)      Albumin % 07/06/2021 60.0      Albumin 07/06/2021 4.3      Alpha 1 % 07/06/2021 2.9      Alpha 1 07/06/2021 0.2      Alpha 2 % 07/06/2021 11.0      Alpha 2 07/06/2021 0.8      Beta % 07/06/2021 13.7      Beta Globulin 07/06/2021 1.0      Gamma Globulin % 07/06/2021 12.4      Gamma Globulin 07/06/2021 0.9      ELP Interpretation 07/06/2021 Normal serum protein electrophoresis.      Protein Total 07/06/2021 7.2      Path ICD: 07/06/2021 G62.9      Reviewing Pathologist 07/06/2021 Gerald Self MD      Immunofixation ELP 07/06/2021 No monoclonal component identified.      Path ICD: 07/06/2021 G62.9      Reviewing Pathologist 07/06/2021 Gerald Self MD      Antinuclear Antibodies (* 07/06/2021 0.9      Angiotensin Converting E* 07/06/2021 41      Lyme Disease Antibodies * 07/06/2021 0.06      Folic Acid 07/06/2021 14.7      Vitamin B6 07/06/2021 81.0      Vitamin B1 Whole Blood L* 07/06/2021 150      Vitamin B12 07/06/2021 398      Methylmalonic Acid 07/06/2021 See Scanned Result          Total time spent for face to face visit, reviewing labs/imaging studies, counseling and coordination of care was: 30 Minutes spent on the date of the encounter doing chart review, review of outside records, review of test results, interpretation of tests, patient visit and documentation     This  note was dictated using voice recognition software.  Any grammatical or context distortions are unintentional and inherent to the software.    No orders of the defined types were placed in this encounter.     New Prescriptions    No medications on file     Modified Medications    Modified Medication Previous Medication    PREGABALIN (LYRICA) 200 MG CAPSULE pregabalin (LYRICA) 150 MG capsule       Take 1 capsule (200 mg) by mouth 3 times daily    TAKE 1 CAPSULE BY MOUTH THREE TIMES DAILY    TRAMADOL (ULTRAM) 50 MG TABLET traMADol (ULTRAM) 50 MG tablet       Take 1 tablet (50 mg) by mouth every 8 hours as needed for severe pain    Take 1 tablet (50 mg) by mouth every 8 hours as needed for severe pain (7-10)

## 2023-04-12 ENCOUNTER — OFFICE VISIT (OUTPATIENT)
Dept: NEUROLOGY | Facility: CLINIC | Age: 68
End: 2023-04-12
Payer: COMMERCIAL

## 2023-04-12 VITALS
HEART RATE: 63 BPM | HEIGHT: 70 IN | DIASTOLIC BLOOD PRESSURE: 81 MMHG | BODY MASS INDEX: 27.16 KG/M2 | WEIGHT: 189.7 LBS | SYSTOLIC BLOOD PRESSURE: 148 MMHG

## 2023-04-12 DIAGNOSIS — M48.062 NEUROGENIC CLAUDICATION DUE TO LUMBAR SPINAL STENOSIS: Primary | ICD-10-CM

## 2023-04-12 DIAGNOSIS — G62.9 PERIPHERAL POLYNEUROPATHY: ICD-10-CM

## 2023-04-12 DIAGNOSIS — E11.42 DIABETIC SENSORIMOTOR POLYNEUROPATHY (H): ICD-10-CM

## 2023-04-12 PROBLEM — F33.9 DEPRESSION, RECURRENT (H): Status: ACTIVE | Noted: 2022-07-28

## 2023-04-12 PROCEDURE — 99214 OFFICE O/P EST MOD 30 MIN: CPT | Performed by: PSYCHIATRY & NEUROLOGY

## 2023-04-12 RX ORDER — TRAMADOL HYDROCHLORIDE 50 MG/1
50 TABLET ORAL EVERY 8 HOURS PRN
Qty: 90 TABLET | Refills: 1 | Status: SHIPPED | OUTPATIENT
Start: 2023-04-12 | End: 2023-07-25

## 2023-04-12 RX ORDER — PREGABALIN 200 MG/1
200 CAPSULE ORAL 3 TIMES DAILY
Qty: 90 CAPSULE | Refills: 5 | Status: SHIPPED | OUTPATIENT
Start: 2023-04-12 | End: 2023-06-20

## 2023-04-12 NOTE — LETTER
2023         RE: Hardeep Silvestre  [address] [place] [id number]        Dear Colleague,    Thank you for referring your patient, Hardeep Silvestre, to the [facility] [place]. Please see a copy of my visit note below.    NEUROLOGY FOLLOW UP VISIT  NOTE       [facility] [place]  [address] [place] [address]  Tel: [phone]  Fax: [phone]  [email or web address]     Hardeep Silvestre,  1955, MRN [id number]  PCP: Clint Gaona  Date: 2023      ASSESSMENT & PLAN     Visit Diagnosis  1. Neurogenic claudication due to lumbar spinal stenosis  2. Diabetic sensorimotor polyneuropathy (H)     Diabetic sensorimotor polyneuropathy  67-year-old male with history of CAD, PAD, HTN, lumbar stenosis s/p fusion who returns for follow-up for diabetic polyneuropathy.  Previously was tried on gabapentin that resulted in increased sedation but Lyrica and tramadol has been helpful.  I have recommended:    1.  Increase Lyrica to 200 mg 3 times daily.  Prescriptions are filled  2.  Use tramadol 1 tablet daily as needed  3.  I have informed patient we will not be able to completely get rid of his symptoms and that he will need to learn to live with some discomfort  4.  Follow-up will be in 1 year    Thank you again for this referral, please feel free to contact me if you have any questions.    Jamal Barahona MD  [facility]  (Formerly, [facility]     HISTORY OF PRESENT ILLNESS     Patient is a 67-year-old male with history of CAD, PAD, HTN, lumbar stenosis s/p fusion last seen on 2022 who returns for follow-up for his diabetic polyneuropathy.  Previously was tried on gabapentin and Cymbalta that did not help but during his last visit he reported improvement with Lyrica.  Dose was increased to 100 mg 3 times daily in addition to tramadol 1 tablet 3 times daily.  Dr. Gaona  had also started him on Cymbalta that he feels helped.  Patient feels current dose of Lyrica is helping.  Occasionally he will take tramadol also.  He still has feeling of swelling in his leg and try to get some over-the-counter cream that did not seem to help.    Patient's symptoms started more than 17 years ago.  Initially he had some back pain and was noted to have left L5 radiculopathy.  He also developed foot drop and had surgery with some improvement.  Due to his persistent symptoms he was seen at [facility] and had EMG that showed chronic left L5 radiculopathy but no compelling evidence of large fiber peripheral neuropathy.  He also had autonomic reflex screening at that time that raised the possibility of early small fiber neuropathy.  He was also seen by vascular surgery and had a stent placed and was told there is nothing much that can be done.  He was started on gabapentin that he discontinued it.  His symptoms got worse and he was seen by orthopedics and had lumbar fusion done but it did not result in any improvement in his symptoms.  In addition to paresthesias he also had impotence but denied other autonomic symptoms.  In 2017 he was evaluated in our clinic with similar symptoms and his hemoglobin A1c was 7.1, antinuclear antibody, SSA/SSB antibody, rheumatoid factor, Lyme titer were normal.  He had repeat lab work in 2021 that showed normal methylmalonic acid level, vitamin B1, vitamin B6, vitamin B12, folate, Lyme titer, angiotensin-converting enzyme, antinuclear antibody, SPEP and immunofixation.  Hemoglobin A1c was elevated at 7.4.  EMG repeated in 2021 showed fairly advanced length dependent mixed sensorimotor polyneuropathy     PROBLEM LIST   Patient Active Problem List   Diagnosis Code     Coronary artery disease involving native coronary artery of native heart without angina pectoris I25.10     Celiac disease K90.0     Type 2 diabetes mellitus with other specified complication, without  long-term current use of insulin (H) E11.69     Essential hypertension I10     Hyperlipidemia E78.5     Neurogenic claudication due to lumbar spinal stenosis M48.062     Peripheral arterial disease (H) I73.9     Diabetic sensorimotor polyneuropathy (H) E11.42     Depression, recurrent (H) F33.9         PAST MEDICAL & SURGICAL HISTORY     Past Medical History:   Patient  has a past medical history of Claudication (H), H/O heart artery stent (2003), Hyperlipidemia, Hypertension, MI (myocardial infarction) (H), PAD (peripheral artery disease) (H), and Type 2 diabetes mellitus with other specified complication, without long-term current use of insulin (H).    Surgical History:  He  has a past surgical history that includes BILATERAL LUMBAR 3-4, LUMBAR 4-5 TRANSFORAMINAL LUMBAR INTERBODY FUSION; CORONARY ANGIOPLASTY WITH STENT PLACEMENT; hernia repair; shoulder surgery; Angioplasty; and hernia repair.     SOCIAL HISTORY     Reviewed, and he  reports that he quit smoking about 20 years ago. His smoking use included cigarettes. He smoked an average of 2 packs per day. He has never used smokeless tobacco. He reports current alcohol use. He reports that he does not currently use drugs.     FAMILY HISTORY     Reviewed, and family history includes Coronary Artery Disease in his father; Diabetes in his mother; Heart Failure in his father; Liver Disease in his mother.     ALLERGIES     Allergies   Allergen Reactions     Macadamia Nut Oil Anaphylaxis     Atorvastatin Muscle Pain (Myalgia)         REVIEW OF SYSTEMS     A 12 point review of system was performed and was negative except as outlined in the history of present illness.     HOME MEDICATIONS     Current Outpatient Rx   Medication Sig Dispense Refill     pregabalin (LYRICA) 200 MG capsule Take 1 capsule (200 mg) by mouth 3 times daily 90 capsule 5     traMADol (ULTRAM) 50 MG tablet Take 1 tablet (50 mg) by mouth every 8 hours as needed for severe pain 90 tablet 1      "acetaminophen (TYLENOL) 500 MG tablet Take 1,000 mg by mouth       aspirin (ASA) 81 MG EC tablet Take 81 mg by mouth       atenolol (TENORMIN) 25 MG tablet Take 25 mg by mouth       DULoxetine (CYMBALTA) 20 MG capsule Take 20 mg by mouth in the morning.       Ibuprofen-diphenhydrAMINE Cit (ADVIL PM PO)        losartan (COZAAR) 50 MG tablet Take 50 mg by mouth       Melatonin 10 MG TABS tablet Take 10 mg by mouth nightly as needed for sleep       metFORMIN (GLUCOPHAGE-XR) 500 MG 24 hr tablet Take 1,000 mg by mouth       multivitamin, therapeutic with minerals (THERA-VIT-M) TABS tablet Take 1 tablet by mouth       nitroGLYcerin (NITROSTAT) 0.4 MG sublingual tablet Place 0.4 mg under the tongue       omeprazole (PRILOSEC) 20 MG DR capsule Take 20 mg by mouth       rosuvastatin (CRESTOR) 20 MG tablet Take 20 mg by mouth           PHYSICAL EXAM     Vital signs  BP (!) 148/81   Pulse 63   Ht 1.778 m (5' 10\")   Wt 86 kg (189 lb 11.2 oz)   BMI 27.22 kg/m      Weight:   189 lbs 11.2 oz    Patient is alert and oriented x4 in no acute distress. Vital signs were reviewed and are documented in electronic medical record. Neck was supple, no carotid bruits, thyromegaly, JVD, or lymphadenopathy was noted.   NEUROLOGY EXAM:    Patient s speech was normal with no aphasia or dysarthria. Mentation, and affect were also normal.     Funduscopic exam was normal, with normal cup to disc ratio. Cranial nerves II -XII were intact.     Patient had normal mass, tone and motor strength was 5/5 in all extremities without pronator drift.     Sensation was decreased to light touch pinprick vibratory and temperature sensation below knees    Reflexes were 2+ in the upper extremity 1+ at knees absent at ankles    No dysmetria noted on FNF or HKS. Romberg was negative.    Gait testing was normal. Able to walk on toes/heels. Tandem walk normal.           PERTINENT DIAGNOSTIC STUDIES     Following studies were reviewed:     MRI LUMBAR SPINE " 1/25/2021  1.  Unchanged mild L4-L5 degenerative disc disease with an unchanged small diffuse posterior disc bulge and previous postoperative changes of her treatment compressive laminectomy.  There is a small diffuse posterior disc bulge contributes to a mild midline spinal canal stenosis with mild narrowing of the right lateral recess, where there is redemonstrated slight contact of the traversing right L5 nerve root  2.  Unchanged moderate midline L3-L4 spinal canal stenosis with slight narrowing of the right lateral recess and mild narrowing of the left L3-L4 lateral recess.  3.  Unchanged moderate bilateral L3-L4 and moderate left L4-L5 neuroforaminal stenosis  4.  Moderate bilateral L3-L4 facet arthropathy with interval development of small facet joint effusion.  This is accompanied by a small amount of adjacent degenerative osseous edema  5.  Additional severe bilateral L4-L5 facet arthropathy with unchanged 2 mm degenerative anterolisthesis of L3 on L4 and L4 and L5  6.  Relatively short pedicles in the lower lumbar spine contribute to a diffuse congenital spinal canal narrowing     MRI LUMBAR SPINE 3/9/2017  Multilevel degenerative lumbar spondylosis, a  developmentally narrowed mid to lower lumbar spinal canal, interval  bilateral decompressive laminectomies at L4-5 and the following  notable findings:  1.  Decreased size of 2 to 3 mm mildly cranially directed L4-5 right  paracentral disc protrusion with mild central stenosis, but no  traversing neural compression.  2.  Unchanged chronic moderate L3-4 and mild L2-3 central stenosis  without traversing neural compression.  3.  No acute fracture, spondylolysis or evidence of arachnoidal  adhesive disease.  4.  Chronic mild right/mild to moderate left L4-5 and mild bilateral  L3-4 foraminal stenosis without ganglionic compression.  5.  Mild bilateral L5-S1, moderate right/mild to moderate left L4-5  and moderate left/mild to moderate right L3-4 facet  arthrosis.     AUTONOMIC REFLEX SCREEN 7/6/2017  There is evidence of focal postganglionic sudomotor dysfunction while cardiovagal and adrenergic functions are preserved. Local factors could be responsible for the result, but an early small fiber neuropathy cannot be excluded.     EMG 7/21/2021  This is a abnormal nerve conduction and EMG study of lower extremity that suggests length dependent mixed sensorimotor predominantly axonal polyneuropathy.     EMG 7/5/2017  Abnormal study. There is electrophysiologic evidence of a chronic,   inactive, left L5 lumbar radiculopathy. There is no compelling evidence of large fiber peripheral neuropathy          PERTINENT LABS  Following labs were reviewed:  No visits with results within 3 Month(s) from this visit.   Latest known visit with results is:   Ambulatory - [facility] on 07/06/2021   Component Date Value     Hemoglobin A1C 07/06/2021 7.4 (H)      Albumin % 07/06/2021 60.0      Albumin 07/06/2021 4.3      Alpha 1 % 07/06/2021 2.9      Alpha 1 07/06/2021 0.2      Alpha 2 % 07/06/2021 11.0      Alpha 2 07/06/2021 0.8      Beta % 07/06/2021 13.7      Beta Globulin 07/06/2021 1.0      Gamma Globulin % 07/06/2021 12.4      Gamma Globulin 07/06/2021 0.9      ELP Interpretation 07/06/2021 Normal serum protein electrophoresis.      Protein Total 07/06/2021 7.2      Path ICD: 07/06/2021 G62.9      Reviewing Pathologist 07/06/2021 Gerald Self MD      Immunofixation ELP 07/06/2021 No monoclonal component identified.      Path ICD: 07/06/2021 G62.9      Reviewing Pathologist 07/06/2021 Gerald Self MD      Antinuclear Antibodies (* 07/06/2021 0.9      Angiotensin Converting E* 07/06/2021 41      Lyme Disease Antibodies * 07/06/2021 0.06      Folic Acid 07/06/2021 14.7      Vitamin B6 07/06/2021 81.0      Vitamin B1 Whole Blood L* 07/06/2021 150      Vitamin B12 07/06/2021 398      Methylmalonic Acid 07/06/2021 See Scanned Result          Total time spent for face to  face visit, reviewing labs/imaging studies, counseling and coordination of care was: 30 Minutes spent on the date of the encounter doing chart review, review of outside records, review of test results, interpretation of tests, patient visit and documentation       This note was dictated using voice recognition software.  Any grammatical or context distortions are unintentional and inherent to the software.    No orders of the defined types were placed in this encounter.     New Prescriptions    No medications on file     Modified Medications    Modified Medication Previous Medication    PREGABALIN (LYRICA) 200 MG CAPSULE pregabalin (LYRICA) 150 MG capsule       Take 1 capsule (200 mg) by mouth 3 times daily    TAKE 1 CAPSULE BY MOUTH THREE TIMES DAILY    TRAMADOL (ULTRAM) 50 MG TABLET traMADol (ULTRAM) 50 MG tablet       Take 1 tablet (50 mg) by mouth every 8 hours as needed for severe pain    Take 1 tablet (50 mg) by mouth every 8 hours as needed for severe pain (7-10)                     Again, thank you for allowing me to participate in the care of your patient.        Sincerely,        Jamal Barahona MD

## 2023-04-12 NOTE — NURSING NOTE
Chief Complaint   Patient presents with     NEUROPATHY     Follow up     Violeta Baker on 4/12/2023 at 11:20 AM

## 2023-05-14 ENCOUNTER — HEALTH MAINTENANCE LETTER (OUTPATIENT)
Age: 68
End: 2023-05-14

## 2023-06-20 DIAGNOSIS — E11.42 DIABETIC SENSORIMOTOR POLYNEUROPATHY (H): ICD-10-CM

## 2023-06-20 RX ORDER — PREGABALIN 150 MG/1
150 CAPSULE ORAL 3 TIMES DAILY
Qty: 90 CAPSULE | Refills: 5 | Status: SHIPPED | OUTPATIENT
Start: 2023-06-20 | End: 2024-01-22

## 2023-06-20 NOTE — TELEPHONE ENCOUNTER
[facility]    Phone Message    May a detailed message be left on voicemail: yes     Reason for Call: Medication Question or concern regarding medication   Prescription Clarification  Name of Medication: pregabalin (LYRICA) 200 MG capsule  Prescribing Provider:    Pharmacy:[facility] [address] [place] [address] - [address]   What on the order needs clarification? Patient called requesting a refill and to decrease his medication back to 150 MG 3x a day. Patient states that the only difference he noticed with the 200 Mg is that he is more tired.          Action Taken: Other: [facility]    Travel Screening: Not Applicable

## 2023-06-20 NOTE — TELEPHONE ENCOUNTER
Patient would like to decrease his Lyrica back to 150mg TID instead of 200mg as he feels more drowsy  If ok, Medication T'd for review and signature  Kisha Paz CMA on 6/20/2023 at 10:34 AM     How Severe Is It?: moderate Is This A New Presentation, Or A Follow-Up?: Follow Up Isotretinoin

## 2023-07-25 DIAGNOSIS — G62.9 PERIPHERAL POLYNEUROPATHY: ICD-10-CM

## 2023-07-25 RX ORDER — TRAMADOL HYDROCHLORIDE 50 MG/1
50 TABLET ORAL DAILY PRN
Qty: 90 TABLET | Refills: 0 | Status: SHIPPED | OUTPATIENT
Start: 2023-07-25 | End: 2023-10-07

## 2023-07-25 NOTE — TELEPHONE ENCOUNTER
Refill request for Tramadol  Last OV mentions taking 1 tab per day but rx reads to taking q8hrs PRN  Changed rx to read 1 tab per day  #90 with 0 refills Medication T'd for review and signature    Kisha Paz CMA on 7/25/2023 at 3:38 PM

## 2023-10-03 DIAGNOSIS — G62.9 PERIPHERAL POLYNEUROPATHY: ICD-10-CM

## 2023-10-06 NOTE — TELEPHONE ENCOUNTER
Filled 7/25/23 for #90 - Too soon for refill??     Refill request for: Tramadol    Directions: Take 1 tablet daily as needed for pain     LOV: 4/12/23  NOV: 4/11/24

## 2023-10-07 RX ORDER — TRAMADOL HYDROCHLORIDE 50 MG/1
TABLET ORAL
Qty: 30 TABLET | Refills: 0 | Status: SHIPPED | OUTPATIENT
Start: 2023-10-07 | End: 2023-12-07

## 2023-10-15 ENCOUNTER — HEALTH MAINTENANCE LETTER (OUTPATIENT)
Age: 68
End: 2023-10-15

## 2023-12-07 DIAGNOSIS — G62.9 PERIPHERAL POLYNEUROPATHY: ICD-10-CM

## 2023-12-07 RX ORDER — TRAMADOL HYDROCHLORIDE 50 MG/1
TABLET ORAL
Qty: 30 TABLET | Refills: 0 | Status: SHIPPED | OUTPATIENT
Start: 2023-12-07 | End: 2024-01-22

## 2023-12-07 NOTE — TELEPHONE ENCOUNTER
Refill request for: tramadol 50mg   Directions: TAKE 1 TABLET BY MOUTH ONCE DAILY AS NEEDED FOR SEVERE PAIN     LOV: 04/12/23  NOV: 04/11/24    Last filled on 10/7/23.    30 day supply with 0 refills Medication T'd for review and signature    Francie Stone LPN on 12/7/2023 at 1:23 PM

## 2024-01-19 DIAGNOSIS — G62.9 PERIPHERAL POLYNEUROPATHY: ICD-10-CM

## 2024-01-19 DIAGNOSIS — E11.42 DIABETIC SENSORIMOTOR POLYNEUROPATHY (H): ICD-10-CM

## 2024-01-22 RX ORDER — TRAMADOL HYDROCHLORIDE 50 MG/1
TABLET ORAL
Qty: 30 TABLET | Refills: 1 | Status: SHIPPED | OUTPATIENT
Start: 2024-01-22 | End: 2024-01-30

## 2024-01-22 RX ORDER — PREGABALIN 150 MG/1
150 CAPSULE ORAL 3 TIMES DAILY
Qty: 90 CAPSULE | Refills: 1 | Status: SHIPPED | OUTPATIENT
Start: 2024-01-22 | End: 2024-03-28

## 2024-01-22 NOTE — TELEPHONE ENCOUNTER
Refill request for: Tramadol & Pregabalin    LOV: 4/12/23  NOV: 4/11/24    30 day supply with 1 refills Medication T'd for review and signature

## 2024-01-30 DIAGNOSIS — G62.9 PERIPHERAL POLYNEUROPATHY: ICD-10-CM

## 2024-01-30 RX ORDER — TRAMADOL HYDROCHLORIDE 50 MG/1
TABLET ORAL
Qty: 30 TABLET | Refills: 1 | Status: SHIPPED | OUTPATIENT
Start: 2024-01-30 | End: 2024-03-28

## 2024-01-30 NOTE — TELEPHONE ENCOUNTER
Rx for Tramadol sent 1/22/24 failed to send  Need to try to send again  Medication T'd for review and signature  Kisha Paz CMA on 1/30/2024 at 10:56 AM  [facility]       traMADol (ULTRAM) 50 MG tablet 30 tablet 1 1/22/2024 -- No   Sig: TAKE 1 TABLET BY MOUTH ONCE DAILY AS NEEDED FOR SEVERE PAIN   Sent to pharmacy as: traMADol HCl 50 MG Oral Tablet (ULTRAM)   Class: E-Prescribe   Order: [id number]   E-Prescribing Status: Transmission to pharmacy failed (1/22/2024  9:46 AM CST)   Message completed by Jamal Barahona MD (1/22/2024 10:01 AM).

## 2024-01-30 NOTE — TELEPHONE ENCOUNTER
[facility] Call Center    Phone Message    May a detailed message be left on voicemail: yes     Reason for Call: Medication Refill Request    Has the patient contacted the pharmacy for the refill? Yes   Name of medication being requested: traMADol (ULTRAM) 50 MG tablet  Provider who prescribed the medication: Jamal Barahona  Pharmacy: [facility] [address] [place] [address] - [address]  Date medication is needed: ASAP     Bharat from [facility] in [place] is requesting a call back regarding this refill request. Bharat states there are no refills left on the prescription.     Please call back to advise at # [phone].    Action Taken: Message routed to:  Other: [facility]     Travel Screening: Not Applicable

## 2024-03-03 ENCOUNTER — HEALTH MAINTENANCE LETTER (OUTPATIENT)
Age: 69
End: 2024-03-03

## 2024-03-28 DIAGNOSIS — G62.9 PERIPHERAL POLYNEUROPATHY: ICD-10-CM

## 2024-03-28 DIAGNOSIS — E11.42 DIABETIC SENSORIMOTOR POLYNEUROPATHY (H): ICD-10-CM

## 2024-03-28 RX ORDER — PREGABALIN 150 MG/1
150 CAPSULE ORAL 3 TIMES DAILY
Qty: 90 CAPSULE | Refills: 0 | Status: SHIPPED | OUTPATIENT
Start: 2024-03-28 | End: 2024-04-11

## 2024-03-28 RX ORDER — TRAMADOL HYDROCHLORIDE 50 MG/1
TABLET ORAL
Qty: 30 TABLET | Refills: 0 | Status: SHIPPED | OUTPATIENT
Start: 2024-03-28 | End: 2024-04-11

## 2024-03-28 NOTE — TELEPHONE ENCOUNTER
Refill request for: tramadol 50mg   Directions: Take 1 tablet by mouth once daily as needed for severe pain   Last rx sent on 1/31/24.    Refill request for: pregabalin 150mg   Directions: TAKE 1 CAPSULE BY MOUTH THREE TIMES DAILY     LOV: 4/12/23  NOV: 04/11/24    30 day supply with 0 refills Medication T'd for review and signature    Francie Stone LPN on 3/28/2024 at 10:53 AM

## 2024-04-08 NOTE — PROGRESS NOTES
NEUROLOGY FOLLOW UP VISIT  NOTE       [facility] NEUROLOGY [place]  [address] [place] [address]  Tel: [phone]  Fax: [phone]  [email or web address]     Hardeep Silvestre,  1955, MRN [id number]  PCP: Clint Gaona  Date: 2024      ASSESSMENT & PLAN     Visit Diagnosis  Diabetic sensorimotor polyneuropathy (H)     Diabetic sensorimotor polyneuropathy  68-year-old male with history of CAD, PAD, HTN, lumbar stenosis s/p fusion who is been followed in our clinic for diabetic polyneuropathy.  He has tried gabapentin that resulted in increased sedation but current combination of tramadol and Lyrica is helpful.  I have recommended:    1.  Continue Lyrica 150 mg 3 times daily.  Prescriptions were filled  2.  Continue tramadol as needed  3.  Additionally he is on duloxetine that is being filled by his primary care physician  4.  Continue to follow-up with [facility] for lumbar stenosis with neurogenic claudication.  He recently had epidural injections with minimal relief  5.  Follow-up in 1 year    Thank you again for this referral, please feel free to contact me if you have any questions.    Jamal Barahona MD  [facility]     HISTORY OF PRESENT ILLNESS     Patient is a 68-year-old male with CAD, PAD, HTN, lumbar stenosis s/p fusion last seen on 2023 for diabetic polyneuropathy who returns for follow-up.  Previously he was tried on gabapentin that resulted in sedation but combination of Lyrica and tramadol was helpful.  He was continued on Lyrica 200 mg 3 times daily and tramadol as needed.  We had discussed that in spite of fairly high dose he was still symptomatic and he might need to learn to live with some discomfort.  Since his last visit he reports progressively worsening back pain and numbness tingling.  After walking a short distance he has to sit down.  He has been seen at [facility] also for his lumbar stenosis with  neurogenic claudication.  He had a MRI at [facility] and tells me he had some injections but did not notice much improvement.  Lyrica in combination with tramadol seems to help her symptoms    Patient's symptoms started more than 17 years ago.  Initially he had some back pain and was noted to have left L5 radiculopathy.  He also developed foot drop and had surgery with some improvement.  Due to his persistent symptoms he was seen at [facility] and had EMG that showed chronic left L5 radiculopathy but no compelling evidence of large fiber peripheral neuropathy.  He also had autonomic reflex screening at that time that raised the possibility of early small fiber neuropathy.  He was also seen by vascular surgery and had a stent placed and was told there is nothing much that can be done.  He was started on gabapentin that he discontinued it.  His symptoms got worse and he was seen by orthopedics and had lumbar fusion done but it did not result in any improvement in his symptoms.  In addition to paresthesias he also had impotence but denied other autonomic symptoms.  In 2017 he was evaluated in our clinic with similar symptoms and his hemoglobin A1c was 7.1, antinuclear antibody, SSA/SSB antibody, rheumatoid factor, Lyme titer were normal.  He had repeat lab work in 2021 that showed normal methylmalonic acid level, vitamin B1, vitamin B6, vitamin B12, folate, Lyme titer, angiotensin-converting enzyme, antinuclear antibody, SPEP and immunofixation.  Hemoglobin A1c was elevated at 7.4.  EMG repeated in 2021 showed fairly advanced length dependent mixed sensorimotor polyneuropathy     PROBLEM LIST   Patient Active Problem List   Diagnosis    Coronary artery disease involving native coronary artery of native heart without angina pectoris    Celiac disease    Type 2 diabetes mellitus with other specified complication, without long-term current use of insulin (H)    Essential hypertension    Hyperlipidemia     Neurogenic claudication due to lumbar spinal stenosis    Peripheral arterial disease (H24)    Diabetic sensorimotor polyneuropathy (H)    Depression, recurrent (H24)         PAST MEDICAL & SURGICAL HISTORY     Past Medical History:   Patient  has a past medical history of Claudication (H24), H/O heart artery stent (2003), Hyperlipidemia, Hypertension, MI (myocardial infarction) (H), PAD (peripheral artery disease) (H24), and Type 2 diabetes mellitus with other specified complication, without long-term current use of insulin (H).    Surgical History:  He  has a past surgical history that includes BILATERAL LUMBAR 3-4, LUMBAR 4-5 TRANSFORAMINAL LUMBAR INTERBODY FUSION; CORONARY ANGIOPLASTY WITH STENT PLACEMENT; hernia repair; shoulder surgery; Angioplasty; and hernia repair.     SOCIAL HISTORY     Reviewed, and he  reports that he quit smoking about 21 years ago. His smoking use included cigarettes. He has never used smokeless tobacco. He reports current alcohol use. He reports that he does not currently use drugs.     FAMILY HISTORY     Reviewed, and family history includes Coronary Artery Disease in his father; Diabetes in his mother; Heart Failure in his father; Liver Disease in his mother.     ALLERGIES     Allergies   Allergen Reactions    Macadamia Nut Oil Anaphylaxis    Atorvastatin Muscle Pain (Myalgia)         REVIEW OF SYSTEMS     A 12 point review of system was performed and was negative except as outlined in the history of present illness.     HOME MEDICATIONS     Current Outpatient Rx   Medication Sig Dispense Refill    acetaminophen (TYLENOL) 500 MG tablet Take 1,000 mg by mouth      aspirin (ASA) 81 MG EC tablet Take 81 mg by mouth      atenolol (TENORMIN) 25 MG tablet Take 25 mg by mouth      DULoxetine (CYMBALTA) 20 MG capsule Take 20 mg by mouth in the morning.      losartan (COZAAR) 50 MG tablet Take 50 mg by mouth      Melatonin 10 MG TABS tablet Take 10 mg by mouth nightly as needed for sleep       "metFORMIN (GLUCOPHAGE-XR) 500 MG 24 hr tablet Take 1,000 mg by mouth      multivitamin, therapeutic with minerals (THERA-VIT-M) TABS tablet Take 1 tablet by mouth      nitroGLYcerin (NITROSTAT) 0.4 MG sublingual tablet Place 0.4 mg under the tongue      omeprazole (PRILOSEC) 20 MG DR capsule Take 20 mg by mouth      pregabalin (LYRICA) 150 MG capsule TAKE 1 CAPSULE BY MOUTH THREE TIMES DAILY 90 capsule 0    rosuvastatin (CRESTOR) 20 MG tablet Take 20 mg by mouth      traMADol (ULTRAM) 50 MG tablet TAKE 1 TABLET BY MOUTH ONCE DAILY AS NEEDED FOR SEVERE PAIN 30 tablet 0    Ibuprofen-diphenhydrAMINE Cit (ADVIL PM PO)            PHYSICAL EXAM     Vital signs  BP (!) 168/89 (BP Location: Left arm, Patient Position: Sitting)   Pulse 69   Ht 1.778 m (5' 10\")   Wt 90.7 kg (200 lb)   BMI 28.70 kg/m      Weight:   200 lbs 0 oz    Patient is alert and oriented x4 in no acute distress. Vital signs were reviewed and are documented in electronic medical record. Neck was supple, no carotid bruits, thyromegaly, JVD, or lymphadenopathy was noted.   NEUROLOGY EXAM:   Patient s speech was normal with no aphasia or dysarthria. Mentation, and affect were also normal.    Funduscopic exam was normal, with normal cup to disc ratio. Cranial nerves II -XII were intact.    Patient had normal mass, tone and motor strength was 5/5 in all extremities without pronator drift.    Sensation was decreased to light touch pinprick vibratory and temperature sensation below knees   Reflexes were 2+ in the upper extremity 1+ at knees absent at ankles   No dysmetria noted on FNF or HKS. Romberg was negative.   Gait testing was normal. Able to walk on toes/heels. Tandem walk normal.     PERTINENT DIAGNOSTIC STUDIES     Following studies were reviewed:     MRI LUMBAR SPINE 1/25/2021  1.  Unchanged mild L4-L5 degenerative disc disease with an unchanged small diffuse posterior disc bulge and previous postoperative changes of her treatment compressive " laminectomy.  There is a small diffuse posterior disc bulge contributes to a mild midline spinal canal stenosis with mild narrowing of the right lateral recess, where there is redemonstrated slight contact of the traversing right L5 nerve root  2.  Unchanged moderate midline L3-L4 spinal canal stenosis with slight narrowing of the right lateral recess and mild narrowing of the left L3-L4 lateral recess.  3.  Unchanged moderate bilateral L3-L4 and moderate left L4-L5 neuroforaminal stenosis  4.  Moderate bilateral L3-L4 facet arthropathy with interval development of small facet joint effusion.  This is accompanied by a small amount of adjacent degenerative osseous edema  5.  Additional severe bilateral L4-L5 facet arthropathy with unchanged 2 mm degenerative anterolisthesis of L3 on L4 and L4 and L5  6.  Relatively short pedicles in the lower lumbar spine contribute to a diffuse congenital spinal canal narrowing     MRI LUMBAR SPINE 3/9/2017  Multilevel degenerative lumbar spondylosis, a  developmentally narrowed mid to lower lumbar spinal canal, interval  bilateral decompressive laminectomies at L4-5 and the following  notable findings:  1.  Decreased size of 2 to 3 mm mildly cranially directed L4-5 right  paracentral disc protrusion with mild central stenosis, but no  traversing neural compression.  2.  Unchanged chronic moderate L3-4 and mild L2-3 central stenosis  without traversing neural compression.  3.  No acute fracture, spondylolysis or evidence of arachnoidal  adhesive disease.  4.  Chronic mild right/mild to moderate left L4-5 and mild bilateral  L3-4 foraminal stenosis without ganglionic compression.  5.  Mild bilateral L5-S1, moderate right/mild to moderate left L4-5  and moderate left/mild to moderate right L3-4 facet arthrosis.     AUTONOMIC REFLEX SCREEN 7/6/2017  There is evidence of focal postganglionic sudomotor dysfunction while cardiovagal and adrenergic functions are preserved. Local factors  could be responsible for the result, but an early small fiber neuropathy cannot be excluded.     EMG 7/21/2021  This is a abnormal nerve conduction and EMG study of lower extremity that suggests length dependent mixed sensorimotor predominantly axonal polyneuropathy.     EMG 7/5/2017  Abnormal study. There is electrophysiologic evidence of a chronic,   inactive, left L5 lumbar radiculopathy. There is no compelling evidence of large fiber peripheral neuropathy     PERTINENT LABS  Following labs were reviewed:  No visits with results within 3 Month(s) from this visit.   Latest known visit with results is:   Ambulatory - [facility] on 07/06/2021   Component Date Value Ref Range Status    Hemoglobin A1C 07/06/2021 7.4 (H)  <=5.6 % Final    Albumin % 07/06/2021 60.0  51.0 - 67.0 % Final    Albumin 07/06/2021 4.3  3.2 - 4.7 g/dL Final    Alpha 1 % 07/06/2021 2.9  2.0 - 4.0 % Final    Alpha 1 07/06/2021 0.2  0.1 - 0.3 g/dL Final    Alpha 2 % 07/06/2021 11.0  5.0 - 13.0 % Final    Alpha 2 07/06/2021 0.8  0.4 - 0.9 g/dL Final    Beta % 07/06/2021 13.7  10.0 - 17.0 % Final    Beta Globulin 07/06/2021 1.0  0.7 - 1.2 g/dL Final    Gamma Globulin % 07/06/2021 12.4  9.0 - 20.0 % Final    Gamma Globulin 07/06/2021 0.9  0.6 - 1.4 g/dL Final    ELP Interpretation 07/06/2021 Normal serum protein electrophoresis.   Final    Protein Total 07/06/2021 7.2  6.0 - 8.0 g/dL Final    Path ICD: 07/06/2021 G62.9   Final    Reviewing Pathologist 07/06/2021 Gerald Self MD   Final    Immunofixation ELP 07/06/2021 No monoclonal component identified.   Final    Path ICD: 07/06/2021 G62.9   Final    Reviewing Pathologist 07/06/2021 Gerald Self MD   Final    Antinuclear Antibodies (PANDA) 07/06/2021 0.9  <=2.9 U Final    Angiotensin Converting Enzyme 07/06/2021 41  9 - 67 U/L Final    Lyme Disease Antibodies Total 07/06/2021 0.06  <0.90 Index Value Final    Folic Acid 07/06/2021 14.7  >=3.5 ng/mL Final    Vitamin B6 07/06/2021 81.0   20.0 - 125.0 nmol/L Final    Vitamin B1 Whole Blood Level 07/06/2021 150  70 - 180 nmol/L Final    Vitamin B12 07/06/2021 398  213 - 816 pg/mL Final    Methylmalonic Acid 07/06/2021 See Scanned Result   Final         Total time spent for face to face visit, reviewing labs/imaging studies, counseling and coordination of care was: 30 Minutes spent on the date of the encounter doing chart review, review of outside records, review of test results, interpretation of tests, patient visit, and documentation     The longitudinal plan of care for the diagnosis(es)/condition(s) as documented were addressed during this visit. Due to the added complexity in care, I will continue to support Lavell in the subsequent management and with ongoing continuity of care.    This note was dictated using voice recognition software.  Any grammatical or context distortions are unintentional and inherent to the software.    No orders of the defined types were placed in this encounter.     New Prescriptions    No medications on file     Modified Medications    No medications on file

## 2024-04-11 ENCOUNTER — OFFICE VISIT (OUTPATIENT)
Dept: NEUROLOGY | Facility: CLINIC | Age: 69
End: 2024-04-11
Payer: COMMERCIAL

## 2024-04-11 VITALS
DIASTOLIC BLOOD PRESSURE: 89 MMHG | HEART RATE: 69 BPM | HEIGHT: 70 IN | BODY MASS INDEX: 28.63 KG/M2 | WEIGHT: 200 LBS | SYSTOLIC BLOOD PRESSURE: 168 MMHG

## 2024-04-11 DIAGNOSIS — E11.42 DIABETIC SENSORIMOTOR POLYNEUROPATHY (H): Primary | ICD-10-CM

## 2024-04-11 DIAGNOSIS — G62.9 PERIPHERAL POLYNEUROPATHY: ICD-10-CM

## 2024-04-11 PROBLEM — K90.0 CELIAC DISEASE: Status: RESOLVED | Noted: 2017-07-16 | Resolved: 2024-04-11

## 2024-04-11 PROCEDURE — 99214 OFFICE O/P EST MOD 30 MIN: CPT | Performed by: PSYCHIATRY & NEUROLOGY

## 2024-04-11 PROCEDURE — G2211 COMPLEX E/M VISIT ADD ON: HCPCS | Performed by: PSYCHIATRY & NEUROLOGY

## 2024-04-11 RX ORDER — TRAMADOL HYDROCHLORIDE 50 MG/1
TABLET ORAL
Qty: 30 TABLET | Refills: 5 | Status: SHIPPED | OUTPATIENT
Start: 2024-04-11

## 2024-04-11 RX ORDER — PREGABALIN 150 MG/1
150 CAPSULE ORAL 3 TIMES DAILY
Qty: 90 CAPSULE | Refills: 5 | Status: SHIPPED | OUTPATIENT
Start: 2024-04-11

## 2024-04-11 NOTE — LETTER
2024         RE: Hardeep Silvestre  [address] [place] [id number]        Dear Colleague,    Thank you for referring your patient, Hardeep Silvestre, to the [facility] [place]. Please see a copy of my visit note below.    NEUROLOGY FOLLOW UP VISIT  NOTE       [facility] [place]  [address] [place] [address]  Tel: [phone]  Fax: [phone]  [email or web address]     Hardeep Silvestre,  1955, MRN [id number]  PCP: Clint Gaona  Date: 2024      ASSESSMENT & PLAN     Visit Diagnosis  Diabetic sensorimotor polyneuropathy (H)     Diabetic sensorimotor polyneuropathy  68-year-old male with history of CAD, PAD, HTN, lumbar stenosis s/p fusion who is been followed in our clinic for diabetic polyneuropathy.  He has tried gabapentin that resulted in increased sedation but current combination of tramadol and Lyrica is helpful.  I have recommended:    1.  Continue Lyrica 150 mg 3 times daily.  Prescriptions were filled  2.  Continue tramadol as needed  3.  Additionally he is on duloxetine that is being filled by his primary care physician  4.  Continue to follow-up with [facility] for lumbar stenosis with neurogenic claudication.  He recently had epidural injections with minimal relief  5.  Follow-up in 1 year    Thank you again for this referral, please feel free to contact me if you have any questions.    Jamal Barahona MD  [facility]UR[facility], [place]     HISTORY OF PRESENT ILLNESS     Patient is a 68-year-old male with CAD, PAD, HTN, lumbar stenosis s/p fusion last seen on 2023 for diabetic polyneuropathy who returns for follow-up.  Previously he was tried on gabapentin that resulted in sedation but combination of Lyrica and tramadol was helpful.  He was continued on Lyrica 200 mg 3 times daily and tramadol as needed.  We had discussed that in spite of fairly high dose he was still symptomatic and  he might need to learn to live with some discomfort.  Since his last visit he reports progressively worsening back pain and numbness tingling.  After walking a short distance he has to sit down.  He has been seen at [facility] also for his lumbar stenosis with neurogenic claudication.  He had a MRI at [facility] and tells me he had some injections but did not notice much improvement.  Lyrica in combination with tramadol seems to help her symptoms    Patient's symptoms started more than 17 years ago.  Initially he had some back pain and was noted to have left L5 radiculopathy.  He also developed foot drop and had surgery with some improvement.  Due to his persistent symptoms he was seen at [facility] and had EMG that showed chronic left L5 radiculopathy but no compelling evidence of large fiber peripheral neuropathy.  He also had autonomic reflex screening at that time that raised the possibility of early small fiber neuropathy.  He was also seen by vascular surgery and had a stent placed and was told there is nothing much that can be done.  He was started on gabapentin that he discontinued it.  His symptoms got worse and he was seen by orthopedics and had lumbar fusion done but it did not result in any improvement in his symptoms.  In addition to paresthesias he also had impotence but denied other autonomic symptoms.  In 2017 he was evaluated in our clinic with similar symptoms and his hemoglobin A1c was 7.1, antinuclear antibody, SSA/SSB antibody, rheumatoid factor, Lyme titer were normal.  He had repeat lab work in 2021 that showed normal methylmalonic acid level, vitamin B1, vitamin B6, vitamin B12, folate, Lyme titer, angiotensin-converting enzyme, antinuclear antibody, SPEP and immunofixation.  Hemoglobin A1c was elevated at 7.4.  EMG repeated in 2021 showed fairly advanced length dependent mixed sensorimotor polyneuropathy     PROBLEM LIST   Patient Active Problem List   Diagnosis     Coronary  artery disease involving native coronary artery of native heart without angina pectoris     Celiac disease     Type 2 diabetes mellitus with other specified complication, without long-term current use of insulin (H)     Essential hypertension     Hyperlipidemia     Neurogenic claudication due to lumbar spinal stenosis     Peripheral arterial disease (H24)     Diabetic sensorimotor polyneuropathy (H)     Depression, recurrent (H24)         PAST MEDICAL & SURGICAL HISTORY     Past Medical History:   Patient  has a past medical history of Claudication (H24), H/O heart artery stent (2003), Hyperlipidemia, Hypertension, MI (myocardial infarction) (H), PAD (peripheral artery disease) (H24), and Type 2 diabetes mellitus with other specified complication, without long-term current use of insulin (H).    Surgical History:  He  has a past surgical history that includes BILATERAL LUMBAR 3-4, LUMBAR 4-5 TRANSFORAMINAL LUMBAR INTERBODY FUSION; CORONARY ANGIOPLASTY WITH STENT PLACEMENT; hernia repair; shoulder surgery; Angioplasty; and hernia repair.     SOCIAL HISTORY     Reviewed, and he  reports that he quit smoking about 21 years ago. His smoking use included cigarettes. He has never used smokeless tobacco. He reports current alcohol use. He reports that he does not currently use drugs.     FAMILY HISTORY     Reviewed, and family history includes Coronary Artery Disease in his father; Diabetes in his mother; Heart Failure in his father; Liver Disease in his mother.     ALLERGIES     Allergies   Allergen Reactions     Macadamia Nut Oil Anaphylaxis     Atorvastatin Muscle Pain (Myalgia)         REVIEW OF SYSTEMS     A 12 point review of system was performed and was negative except as outlined in the history of present illness.     HOME MEDICATIONS     Current Outpatient Rx   Medication Sig Dispense Refill     acetaminophen (TYLENOL) 500 MG tablet Take 1,000 mg by mouth       aspirin (ASA) 81 MG EC tablet Take 81 mg by mouth    "    atenolol (TENORMIN) 25 MG tablet Take 25 mg by mouth       DULoxetine (CYMBALTA) 20 MG capsule Take 20 mg by mouth in the morning.       losartan (COZAAR) 50 MG tablet Take 50 mg by mouth       Melatonin 10 MG TABS tablet Take 10 mg by mouth nightly as needed for sleep       metFORMIN (GLUCOPHAGE-XR) 500 MG 24 hr tablet Take 1,000 mg by mouth       multivitamin, therapeutic with minerals (THERA-VIT-M) TABS tablet Take 1 tablet by mouth       nitroGLYcerin (NITROSTAT) 0.4 MG sublingual tablet Place 0.4 mg under the tongue       omeprazole (PRILOSEC) 20 MG DR capsule Take 20 mg by mouth       pregabalin (LYRICA) 150 MG capsule TAKE 1 CAPSULE BY MOUTH THREE TIMES DAILY 90 capsule 0     rosuvastatin (CRESTOR) 20 MG tablet Take 20 mg by mouth       traMADol (ULTRAM) 50 MG tablet TAKE 1 TABLET BY MOUTH ONCE DAILY AS NEEDED FOR SEVERE PAIN 30 tablet 0     Ibuprofen-diphenhydrAMINE Cit (ADVIL PM PO)            PHYSICAL EXAM     Vital signs  BP (!) 168/89 (BP Location: Left arm, Patient Position: Sitting)   Pulse 69   Ht 1.778 m (5' 10\")   Wt 90.7 kg (200 lb)   BMI 28.70 kg/m      Weight:   200 lbs 0 oz    Patient is alert and oriented x4 in no acute distress. Vital signs were reviewed and are documented in electronic medical record. Neck was supple, no carotid bruits, thyromegaly, JVD, or lymphadenopathy was noted.   NEUROLOGY EXAM:    Patient s speech was normal with no aphasia or dysarthria. Mentation, and affect were also normal.     Funduscopic exam was normal, with normal cup to disc ratio. Cranial nerves II -XII were intact.     Patient had normal mass, tone and motor strength was 5/5 in all extremities without pronator drift.     Sensation was decreased to light touch pinprick vibratory and temperature sensation below knees    Reflexes were 2+ in the upper extremity 1+ at knees absent at ankles    No dysmetria noted on FNF or HKS. Romberg was negative.    Gait testing was normal. Able to walk on toes/heels. " Tandem walk normal.     PERTINENT DIAGNOSTIC STUDIES     Following studies were reviewed:     MRI LUMBAR SPINE 1/25/2021  1.  Unchanged mild L4-L5 degenerative disc disease with an unchanged small diffuse posterior disc bulge and previous postoperative changes of her treatment compressive laminectomy.  There is a small diffuse posterior disc bulge contributes to a mild midline spinal canal stenosis with mild narrowing of the right lateral recess, where there is redemonstrated slight contact of the traversing right L5 nerve root  2.  Unchanged moderate midline L3-L4 spinal canal stenosis with slight narrowing of the right lateral recess and mild narrowing of the left L3-L4 lateral recess.  3.  Unchanged moderate bilateral L3-L4 and moderate left L4-L5 neuroforaminal stenosis  4.  Moderate bilateral L3-L4 facet arthropathy with interval development of small facet joint effusion.  This is accompanied by a small amount of adjacent degenerative osseous edema  5.  Additional severe bilateral L4-L5 facet arthropathy with unchanged 2 mm degenerative anterolisthesis of L3 on L4 and L4 and L5  6.  Relatively short pedicles in the lower lumbar spine contribute to a diffuse congenital spinal canal narrowing     MRI LUMBAR SPINE 3/9/2017  Multilevel degenerative lumbar spondylosis, a  developmentally narrowed mid to lower lumbar spinal canal, interval  bilateral decompressive laminectomies at L4-5 and the following  notable findings:  1.  Decreased size of 2 to 3 mm mildly cranially directed L4-5 right  paracentral disc protrusion with mild central stenosis, but no  traversing neural compression.  2.  Unchanged chronic moderate L3-4 and mild L2-3 central stenosis  without traversing neural compression.  3.  No acute fracture, spondylolysis or evidence of arachnoidal  adhesive disease.  4.  Chronic mild right/mild to moderate left L4-5 and mild bilateral  L3-4 foraminal stenosis without ganglionic compression.  5.  Mild bilateral  L5-S1, moderate right/mild to moderate left L4-5  and moderate left/mild to moderate right L3-4 facet arthrosis.     AUTONOMIC REFLEX SCREEN 7/6/2017  There is evidence of focal postganglionic sudomotor dysfunction while cardiovagal and adrenergic functions are preserved. Local factors could be responsible for the result, but an early small fiber neuropathy cannot be excluded.     EMG 7/21/2021  This is a abnormal nerve conduction and EMG study of lower extremity that suggests length dependent mixed sensorimotor predominantly axonal polyneuropathy.     EMG 7/5/2017  Abnormal study. There is electrophysiologic evidence of a chronic,   inactive, left L5 lumbar radiculopathy. There is no compelling evidence of large fiber peripheral neuropathy     PERTINENT LABS  Following labs were reviewed:  No visits with results within 3 Month(s) from this visit.   Latest known visit with results is:   Ambulatory - [facility] on 07/06/2021   Component Date Value Ref Range Status     Hemoglobin A1C 07/06/2021 7.4 (H)  <=5.6 % Final     Albumin % 07/06/2021 60.0  51.0 - 67.0 % Final     Albumin 07/06/2021 4.3  3.2 - 4.7 g/dL Final     Alpha 1 % 07/06/2021 2.9  2.0 - 4.0 % Final     Alpha 1 07/06/2021 0.2  0.1 - 0.3 g/dL Final     Alpha 2 % 07/06/2021 11.0  5.0 - 13.0 % Final     Alpha 2 07/06/2021 0.8  0.4 - 0.9 g/dL Final     Beta % 07/06/2021 13.7  10.0 - 17.0 % Final     Beta Globulin 07/06/2021 1.0  0.7 - 1.2 g/dL Final     Gamma Globulin % 07/06/2021 12.4  9.0 - 20.0 % Final     Gamma Globulin 07/06/2021 0.9  0.6 - 1.4 g/dL Final     ELP Interpretation 07/06/2021 Normal serum protein electrophoresis.   Final     Protein Total 07/06/2021 7.2  6.0 - 8.0 g/dL Final     Path ICD: 07/06/2021 G62.9   Final     Reviewing Pathologist 07/06/2021 Gerald Self MD   Final     Immunofixation ELP 07/06/2021 No monoclonal component identified.   Final     Path ICD: 07/06/2021 G62.9   Final     Reviewing Pathologist 07/06/2021 Gerald RING  MD Clair   Final     Antinuclear Antibodies (PANDA) 07/06/2021 0.9  <=2.9 U Final     Angiotensin Converting Enzyme 07/06/2021 41  9 - 67 U/L Final     Lyme Disease Antibodies Total 07/06/2021 0.06  <0.90 Index Value Final     Folic Acid 07/06/2021 14.7  >=3.5 ng/mL Final     Vitamin B6 07/06/2021 81.0  20.0 - 125.0 nmol/L Final     Vitamin B1 Whole Blood Level 07/06/2021 150  70 - 180 nmol/L Final     Vitamin B12 07/06/2021 398  213 - 816 pg/mL Final     Methylmalonic Acid 07/06/2021 See Scanned Result   Final         Total time spent for face to face visit, reviewing labs/imaging studies, counseling and coordination of care was: 30 Minutes spent on the date of the encounter doing chart review, review of outside records, review of test results, interpretation of tests, patient visit, and documentation     The longitudinal plan of care for the diagnosis(es)/condition(s) as documented were addressed during this visit. Due to the added complexity in care, I will continue to support Lavell in the subsequent management and with ongoing continuity of care.    This note was dictated using voice recognition software.  Any grammatical or context distortions are unintentional and inherent to the software.    No orders of the defined types were placed in this encounter.     New Prescriptions    No medications on file     Modified Medications    No medications on file                 Again, thank you for allowing me to participate in the care of your patient.        Sincerely,        Jamal Barahona MD

## 2024-04-11 NOTE — NURSING NOTE
Chief Complaint   Patient presents with    NEUROPATHY     Annual follow up- patient reports burning sensation has worsened in feet      Kisha Paz CMA on 4/11/2024 at 10:47 AM  [facility]

## 2024-07-21 ENCOUNTER — HEALTH MAINTENANCE LETTER (OUTPATIENT)
Age: 69
End: 2024-07-21

## 2024-08-15 ENCOUNTER — HOSPITAL ENCOUNTER (EMERGENCY)
Facility: CLINIC | Age: 69
Discharge: HOME | End: 2024-08-15
Attending: FAMILY MEDICINE | Admitting: FAMILY MEDICINE
Payer: COMMERCIAL

## 2024-08-15 ENCOUNTER — APPOINTMENT (OUTPATIENT)
Dept: GENERAL RADIOLOGY | Facility: CLINIC | Age: 69
End: 2024-08-15
Attending: FAMILY MEDICINE
Payer: COMMERCIAL

## 2024-08-15 VITALS
TEMPERATURE: 97.8 F | HEART RATE: 73 BPM | DIASTOLIC BLOOD PRESSURE: 85 MMHG | RESPIRATION RATE: 18 BRPM | BODY MASS INDEX: 27.26 KG/M2 | SYSTOLIC BLOOD PRESSURE: 115 MMHG | WEIGHT: 190 LBS | OXYGEN SATURATION: 96 %

## 2024-08-15 DIAGNOSIS — S62.639B OPEN FRACTURE OF TUFT OF DISTAL PHALANX OF FINGER: ICD-10-CM

## 2024-08-15 DIAGNOSIS — S61.411A LACERATION OF RIGHT HAND WITHOUT FOREIGN BODY, INITIAL ENCOUNTER: ICD-10-CM

## 2024-08-15 DIAGNOSIS — S61.314A LACERATION OF RIGHT RING FINGER WITHOUT FOREIGN BODY WITH DAMAGE TO NAIL, INITIAL ENCOUNTER: ICD-10-CM
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
## 2024-08-15 DIAGNOSIS — S80.211A KNEE ABRASION, RIGHT, INITIAL ENCOUNTER: ICD-10-CM

## 2024-08-15 DIAGNOSIS — S61.312A LACERATION OF RIGHT MIDDLE FINGER WITHOUT FOREIGN BODY WITH DAMAGE TO NAIL, INITIAL ENCOUNTER: ICD-10-CM
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
## 2024-08-15 PROCEDURE — 99284 EMERGENCY DEPT VISIT MOD MDM: CPT | Performed by: FAMILY MEDICINE

## 2024-08-15 PROCEDURE — 250N000013 HC RX MED GY IP 250 OP 250 PS 637: Performed by: FAMILY MEDICINE

## 2024-08-15 PROCEDURE — 99284 EMERGENCY DEPT VISIT MOD MDM: CPT | Mod: 25 | Performed by: FAMILY MEDICINE

## 2024-08-15 PROCEDURE — 13132 CMPLX RPR F/C/C/M/N/AX/G/H/F: CPT | Mod: F7 | Performed by: FAMILY MEDICINE

## 2024-08-15 PROCEDURE — 12002 RPR S/N/AX/GEN/TRNK2.6-7.5CM: CPT | Mod: RT | Performed by: FAMILY MEDICINE

## 2024-08-15 PROCEDURE — 13132 CMPLX RPR F/C/C/M/N/AX/G/H/F: CPT | Mod: F7,F8 | Performed by: FAMILY MEDICINE

## 2024-08-15 PROCEDURE — 73130 X-RAY EXAM OF HAND: CPT | Mod: RT

## 2024-08-15 RX ORDER — OXYCODONE HYDROCHLORIDE 5 MG/1
5 TABLET ORAL EVERY 6 HOURS PRN
Qty: 10 TABLET | Refills: 0 | Status: SHIPPED | OUTPATIENT
Start: 2024-08-15

## 2024-08-15 RX ORDER — CEPHALEXIN 500 MG/1
500 CAPSULE ORAL 4 TIMES DAILY
Qty: 40 CAPSULE | Refills: 0 | Status: SHIPPED | OUTPATIENT
Start: 2024-08-15

## 2024-08-15 RX ORDER — ACETAMINOPHEN 500 MG
1000 TABLET ORAL ONCE
Status: COMPLETED | OUTPATIENT
Start: 2024-08-15 | End: 2024-08-15

## 2024-08-15 RX ORDER — CEPHALEXIN 500 MG/1
1000 CAPSULE ORAL ONCE
Status: COMPLETED | OUTPATIENT
Start: 2024-08-15 | End: 2024-08-15

## 2024-08-15 RX ADMIN — ACETAMINOPHEN 1000 MG: 500 TABLET, FILM COATED ORAL at 21:41

## 2024-08-15 RX ADMIN — CEPHALEXIN 1000 MG: 500 CAPSULE ORAL at 21:41

## 2024-08-15 ASSESSMENT — ACTIVITIES OF DAILY LIVING (ADL)
ADLS_ACUITY_SCORE: 35

## 2024-08-15 ASSESSMENT — COLUMBIA-SUICIDE SEVERITY RATING SCALE - C-SSRS
6. HAVE YOU EVER DONE ANYTHING, STARTED TO DO ANYTHING, OR PREPARED TO DO ANYTHING TO END YOUR LIFE?: NO
2. HAVE YOU ACTUALLY HAD ANY THOUGHTS OF KILLING YOURSELF IN THE PAST MONTH?: NO
1. IN THE PAST MONTH, HAVE YOU WISHED YOU WERE DEAD OR WISHED YOU COULD GO TO SLEEP AND NOT WAKE UP?: NO

## 2024-08-16 NOTE — ED PROVIDER NOTES
History     Chief Complaint   Patient presents with    Laceration     HPI  Hardeep Silvestre is a 68 year old male, past medical history significant for depression, type 2 diabetes with diabetic sensorimotor polyneuropathy, ASCVD, hypertension, neurogenic claudication due to lumbar spinal stenosis, hyperlipidemia, peripheral arterial disease, presents to the emergency department with concerns of traumatic injury including lacerations of his right hand (nondominant hand).  History is obtained from the patient who states that he had a couple of beers at his home and was taking out the garbage down hill to the end of his driveway.  Unfortunately the garbage got away from him with a wheeled part and he slipped falling forward and ultimately having the hinged lid pinch and partially amputate the tips of his right third and fourth digits as well as lacerating along the ulnar aspect of the right hand and skinning his right knee.  He was able to get back up without difficulty direct pressure was applied he came to the ER.  Tetanus is up-to-date.      Allergies:  Allergies   Allergen Reactions    Macadamia Nut Oil Anaphylaxis    Atorvastatin Muscle Pain (Myalgia)       Problem List:    Patient Active Problem List    Diagnosis Date Noted    Depression, recurrent (H24) 07/28/2022     Priority: Medium    Diabetic sensorimotor polyneuropathy (H) 07/21/2021     Priority: Medium    Coronary artery disease involving native coronary artery of native heart without angina pectoris 06/10/2021     Priority: Medium    Type 2 diabetes mellitus with other specified complication, without long-term current use of insulin (H) 05/11/2021     Priority: Medium    Essential hypertension 05/11/2021     Priority: Medium    Neurogenic claudication due to lumbar spinal stenosis 03/01/2021     Priority: Medium    Hyperlipidemia 07/31/2017     Priority: Medium    Peripheral arterial disease (H24) 07/21/2017     Priority: Medium        Past Medical  History:    Past Medical History:   Diagnosis Date    Claudication (H24)     H/O heart artery stent     Hyperlipidemia     Hypertension     MI (myocardial infarction) (H)     PAD (peripheral artery disease) (H24)     Type 2 diabetes mellitus with other specified complication, without long-term current use of insulin (H)        Past Surgical History:    Past Surgical History:   Procedure Laterality Date    ANGIOPLASTY      BILATERAL LUMBAR 3-4, LUMBAR 4-5 TRANSFORAMINAL LUMBAR INTERBODY FUSION      CORONARY ANGIOPLASTY WITH STENT PLACEMENT      HERNIA REPAIR      HERNIA REPAIR      SHOULDER SURGERY         Family History:    Family History   Problem Relation Age of Onset    Diabetes Mother     Liver Disease Mother     Coronary Artery Disease Father     Heart Failure Father        Social History:  Marital Status:   [2]  Social History     Tobacco Use    Smoking status: Former     Current packs/day: 0.00     Types: Cigarettes     Quit date: 2003     Years since quittin.5    Smokeless tobacco: Never   Substance Use Topics    Alcohol use: Yes    Drug use: Not Currently        Medications:    cephALEXin (KEFLEX) 500 MG capsule  oxyCODONE (ROXICODONE) 5 MG tablet  acetaminophen (TYLENOL) 500 MG tablet  aspirin (ASA) 81 MG EC tablet  atenolol (TENORMIN) 25 MG tablet  DULoxetine (CYMBALTA) 20 MG capsule  Ibuprofen-diphenhydrAMINE Cit (ADVIL PM PO)  losartan (COZAAR) 50 MG tablet  Melatonin 10 MG TABS tablet  metFORMIN (GLUCOPHAGE-XR) 500 MG 24 hr tablet  multivitamin, therapeutic with minerals (THERA-VIT-M) TABS tablet  nitroGLYcerin (NITROSTAT) 0.4 MG sublingual tablet  omeprazole (PRILOSEC) 20 MG DR capsule  pregabalin (LYRICA) 150 MG capsule  rosuvastatin (CRESTOR) 20 MG tablet  traMADol (ULTRAM) 50 MG tablet          Review of Systems   All other systems reviewed and are negative.      Physical Exam   BP: 115/85  Pulse: 73  Temp: 97.8  F (36.6  C)  Resp: 18  Weight: 86.2 kg (190 lb)  SpO2: 96  %      Physical Exam  Vitals and nursing note reviewed.   Constitutional:       General: He is not in acute distress.     Appearance: Normal appearance. He is normal weight. He is not ill-appearing.   Musculoskeletal:        Hands:       Comments: Third digit tip partially amputated with significant nail and nailbed injury no gross deformity more proximally than the DIP.  3 cm laceration  Fourth digit tip is more extensively amputated no intact nailbed split dorsally.  3 cm laceration  On the ulnar aspect of the hand there is an oblique laceration into adipose tissue approximately 4 cm in length slow ooze of blood from all sites especially the digit tips and less so on the ulnar aspect of the hand.     Neurological:      Mental Status: He is alert.         ED Course        Procedures  Procedure note:  After verbal consent from the patient I injected a total of 10 cc per digit 0.5% Marcaine to the right fourth and third digits digital block fashion under sterile technique.  I injected approximately 8.5 cc of 0.5% Marcaine to the ulnar aspect hand laceration as described.  The patient will have all of these irrigated prior to closure as well as x-ray of the right hand.  He was given 1 g of oral Keflex and his tetanus status confirmed up-to-date.  Procedure note:  With verbal consent from the patient and with the assistance of a ABUNDIO Friedman after all lacerations had been irrigated by ERT and then the skin prepped with Hibiclens by myself the wound margins were revised for the third and fourth digits under ring tourniquet's for hemostasis for less than 15 minutes each digit, bony rongeur was used to remove digit tip exposed bone on both digits to facilitate closure of the macerated multiple tissue plane lacerations and crush to the digit tips of both digits.  The nail beds were not salvageable.  Simple interrupted closure was performed in a fashion to completely cover the exposed bone and achieve adequate hemostasis  before the ring tourniquets were removed.  A total of 9 sutures were placed 4-0 nylon to each digit.  The laceration on the ulnar aspect of the hand was repaired with seven 4-0 nylon sutures.  The above was well-tolerated and dressings were applied.  The patient was comfortable with excellent anesthesia with the ring blocks and a local anesthetic to the hand laceration.  His tetanus status is up-to-date and he received oral Keflex prior to closure.  He was given oxycodone prescription as well as Keflex prescription through [facility] given the time of day.  I requested that he come in for a wound check in urgent care or with his primary care provider in 48 hours leave the dressing on until that time.  If he has any concerns of intolerable pain despite pain medication or infection he will return to the emergency department immediately at any time.         Results for orders placed or performed during the hospital encounter of 08/15/24 (from the past 24 hour(s))   XR Hand Right G/E 3 Views    Narrative    EXAM: XR HAND RIGHT G/E 3 VIEWS  LOCATION: [facility]  DATE: 8/15/2024    INDICATION: Traumatic tip amputation of fourth digit, partial tip amputation third digit.  COMPARISON: None.      Impression    IMPRESSION: Acute nondisplaced fracture of the tuft of the distal phalanx of the middle finger with overlying soft tissue injury. Acute partial amputation of the tuft of the distal phalanx of the ring finger and overlying soft tissues with 2 small   residual fracture fragments. Moderate degenerative changes STT joint.       Medications   cephALEXin (KEFLEX) capsule 1,000 mg (1,000 mg Oral $Given 8/15/24 2141)   acetaminophen (TYLENOL) tablet 1,000 mg (1,000 mg Oral $Given 8/15/24 2141)       Assessments & Plan (with Medical Decision Making)   Assessment and plan with medical decision making at the time stamp above.      Disclaimer: This note consists of symbols derived from keyboarding,  dictation and/or voice recognition software. As a result, there may be errors in the script that have gone undetected. Please consider this when interpreting information found in this chart.          I have reviewed the nursing notes.    I have reviewed the findings, diagnosis, plan and need for follow up with the patient.        New Prescriptions    CEPHALEXIN (KEFLEX) 500 MG CAPSULE    Take 1 capsule (500 mg) by mouth 4 times daily    OXYCODONE (ROXICODONE) 5 MG TABLET    Take 1 tablet (5 mg) by mouth every 6 hours as needed for severe pain       Final diagnoses:   Open fracture of tuft of distal phalanx of finger - Right third digit tip   Open fracture of tuft of distal phalanx of finger - Right fourth digit tip   Laceration of right middle finger without foreign body with damage to nail, initial encounter - Amputation of 90% of the germinal matrix and nailbed   Laceration of right ring finger without foreign body with damage to nail, initial encounter   Laceration of right hand without foreign body, initial encounter   Knee abrasion, right, initial encounter   Fall at home, initial encounter       8/15/2024   [facility] EMERGENCY DEPT       Serafin Elliott MD  08/15/24 [id number]

## 2024-08-16 NOTE — ED TRIAGE NOTES
Laceration to left middle and ring finger and hand.  Patient was taking out garbage can and fell.  Garbage can fell on top of hand.  Tip of left ring finger amputated.  Abrasion to right knee.  Patient stated that he did have a few beers tonight.     Triage Assessment (Adult)       Row Name 08/15/24 5000          Triage Assessment    Airway WDL WDL        Respiratory WDL    Respiratory WDL WDL        Peripheral/Neurovascular WDL    Peripheral Neurovascular WDL WDL

## 2024-08-16 NOTE — DISCHARGE INSTRUCTIONS
Wound check in 48 hours with your primary care provider or in urgent care.  Please leave your dressings intact until seen in urgent care/primary care for wound check in 48 hours.  Keep the areas sutured clean dry and protected at all times.  Oral Keflex 500 mg p.o. 4 times daily x 10 days.  Return to the emergency department if any signs of infection or concerns before suture removal with your primary care provider or in the urgent care in approximately 10 days.  Tylenol/ibuprofen as needed for pain.  Oxycodone is prescribed for breakthrough pain.

## 2024-08-17 ENCOUNTER — HOSPITAL ENCOUNTER (EMERGENCY)
Facility: CLINIC | Age: 69
Discharge: HOME | End: 2024-08-17
Attending: NURSE PRACTITIONER | Admitting: NURSE PRACTITIONER
Payer: COMMERCIAL

## 2024-08-17 VITALS
HEART RATE: 67 BPM | TEMPERATURE: 97.1 F | RESPIRATION RATE: 18 BRPM | SYSTOLIC BLOOD PRESSURE: 147 MMHG | OXYGEN SATURATION: 96 % | DIASTOLIC BLOOD PRESSURE: 83 MMHG

## 2024-08-17 DIAGNOSIS — S61.411A LACERATION OF RIGHT HAND WITHOUT FOREIGN BODY, INITIAL ENCOUNTER: ICD-10-CM

## 2024-08-17 DIAGNOSIS — S61.219D COMPLICATED LACERATION OF FINGER, SUBSEQUENT ENCOUNTER: Primary | ICD-10-CM

## 2024-08-17 PROCEDURE — G0463 HOSPITAL OUTPT CLINIC VISIT: HCPCS | Performed by: NURSE PRACTITIONER

## 2024-08-17 PROCEDURE — 99212 OFFICE O/P EST SF 10 MIN: CPT | Performed by: NURSE PRACTITIONER

## 2024-08-17 ASSESSMENT — COLUMBIA-SUICIDE SEVERITY RATING SCALE - C-SSRS
6. HAVE YOU EVER DONE ANYTHING, STARTED TO DO ANYTHING, OR PREPARED TO DO ANYTHING TO END YOUR LIFE?: NO
1. IN THE PAST MONTH, HAVE YOU WISHED YOU WERE DEAD OR WISHED YOU COULD GO TO SLEEP AND NOT WAKE UP?: NO
2. HAVE YOU ACTUALLY HAD ANY THOUGHTS OF KILLING YOURSELF IN THE PAST MONTH?: NO

## 2024-08-17 ASSESSMENT — ACTIVITIES OF DAILY LIVING (ADL): ADLS_ACUITY_SCORE: 35

## 2024-08-17 NOTE — ED PROVIDER NOTES
ED Provider Note  [facility]      History     Chief Complaint   Patient presents with    Wound Check     HPI  Hardeep Silvestre is a 68 year old male who presents for wound check, or lacerations, was seen in the emergency department for repair of traumatic partial amputations third and fourth fingertips and 3 cm laceration of ulnar side of right hand.  Past medical history significant for depression, type 2 diabetes with diabetic sensorimotor polyneuropathy, ASCVD, hypertension, neurogenic claudication due to lumbar spinal stenosis, hyperlipidemia, peripheral arterial disease           Allergies:  Allergies   Allergen Reactions    Macadamia Nut Oil Anaphylaxis    Atorvastatin Muscle Pain (Myalgia)       Problem List:    Patient Active Problem List    Diagnosis Date Noted    Depression, recurrent (H24) 07/28/2022     Priority: Medium    Diabetic sensorimotor polyneuropathy (H) 07/21/2021     Priority: Medium    Coronary artery disease involving native coronary artery of native heart without angina pectoris 06/10/2021     Priority: Medium    Type 2 diabetes mellitus with other specified complication, without long-term current use of insulin (H) 05/11/2021     Priority: Medium    Essential hypertension 05/11/2021     Priority: Medium    Neurogenic claudication due to lumbar spinal stenosis 03/01/2021     Priority: Medium    Hyperlipidemia 07/31/2017     Priority: Medium    Peripheral arterial disease (H24) 07/21/2017     Priority: Medium        Past Medical History:    Past Medical History:   Diagnosis Date    Claudication (H24)     H/O heart artery stent 2003    Hyperlipidemia     Hypertension     MI (myocardial infarction) (H)     PAD (peripheral artery disease) (H24)     Type 2 diabetes mellitus with other specified complication, without long-term current use of insulin (H)        Past Surgical History:    Past Surgical History:   Procedure Laterality Date    ANGIOPLASTY      BILATERAL  LUMBAR 3-4, LUMBAR 4-5 TRANSFORAMINAL LUMBAR INTERBODY FUSION      CORONARY ANGIOPLASTY WITH STENT PLACEMENT      HERNIA REPAIR      HERNIA REPAIR      SHOULDER SURGERY         Family History:    Family History   Problem Relation Age of Onset    Diabetes Mother     Liver Disease Mother     Coronary Artery Disease Father     Heart Failure Father        Social History:  Marital Status:   [2]  Social History     Tobacco Use    Smoking status: Former     Current packs/day: 0.00     Types: Cigarettes     Quit date: 2003     Years since quittin.6    Smokeless tobacco: Never   Substance Use Topics    Alcohol use: Yes    Drug use: Not Currently        Medications:    acetaminophen (TYLENOL) 500 MG tablet  aspirin (ASA) 81 MG EC tablet  atenolol (TENORMIN) 25 MG tablet  cephALEXin (KEFLEX) 500 MG capsule  DULoxetine (CYMBALTA) 20 MG capsule  Ibuprofen-diphenhydrAMINE Cit (ADVIL PM PO)  losartan (COZAAR) 50 MG tablet  Melatonin 10 MG TABS tablet  metFORMIN (GLUCOPHAGE-XR) 500 MG 24 hr tablet  multivitamin, therapeutic with minerals (THERA-VIT-M) TABS tablet  nitroGLYcerin (NITROSTAT) 0.4 MG sublingual tablet  omeprazole (PRILOSEC) 20 MG DR capsule  oxyCODONE (ROXICODONE) 5 MG tablet  pregabalin (LYRICA) 150 MG capsule  rosuvastatin (CRESTOR) 20 MG tablet  traMADol (ULTRAM) 50 MG tablet          Review of Systems  A medically appropriate review of systems was performed with pertinent positives and negatives noted in the HPI, and all other systems negative.    Physical Exam   Patient Vitals for the past 24 hrs:   BP Temp Pulse Resp SpO2   24 1523 (!) 147/83 97.1  F (36.2  C) 67 18 96 %          Physical Exam  General: alert and in no acute distress on arrival  Head: atraumatic, normocephalic  Lungs:  nonlabored, CTA bilateral throughout.  CV: Regular rate and rhythm, extremities warm and perfused  Skin: no rashes, no diaphoresis and skin color normal right hand third and fourth fingers have sutures  present where they were placed covering partial amputations of third and fourth fingertips.  No purulent drainage present normal healing of lacerations.  Hand laceration was repaired on the ulnar aspect of right hand currently 4 cms, normal healing of this repaired laceration.   Neuro: Patient awake, alert, speech is fluent, no focal deficits  Psychiatric: affect/mood normal, appropriate historian      ED Course                 Procedures                    No results found for this or any previous visit (from the past 24 hour(s)).    MEDICATIONS GIVEN IN THE EMERGENCY DEPARTMENT:  Medications - No data to display             Assessments & Plan (with Medical Decision Making)  68 year old male who presents to the Urgent Care for evaluation of skin wounds, laceration repairs of third and fourth digits and of ulnar aspect of right hand, was seen 2 days ago in emergency department, started on Keflex 4 times daily for 10 days, also was given 10 tablets of oxycodone for pain.  Tetanus status is current.  Has a past medical history significant for depression, type 2 diabetes with diabetic sensorimotor polyneuropathy, ASCVD, hypertension. Has neurogenic claudication due to lumbar spinal stenosis, hyperlipidemia and peripheral artery disease.  9 suture knots present over each 3rd and 4th digits (18 total sutures of fingers)  7 suture knots present over hand laceration  All lacerations are healing well without worsening condition.  Orthopedic consult was ordered for follow-up to be seen in 3 to 5 days from today.  Recommended adding Aleve twice daily as needed for pain.  Advised to return if he develops fever, chills, worsening pain, redness, drainage or streaking up the arm.  Advised that she has sutures should be removed approximately day 10.      Orthopedic consult has been ordered for you if you choose to schedule this before they contact you to schedule within 3 to 5 days the central scheduling phone number is  [phone].  Recommend adding Aleve twice daily as needed for pain as this last for 12 hours.  If you develop fever, chills worsening pain, redness or drainage from wounds before being seen by orthopedics please return for further evaluation.  Sutures should stay present until seen by orthopedics unless you are not seen by day 10 after sutures were placed.  I have reviewed the nursing notes.    I have reviewed the findings, diagnosis, plan and need for follow up with the patient.        NEW PRESCRIPTIONS STARTED AT TODAY'S ER VISIT  Discharge Medication List as of 8/17/2024  4:46 PM          Final diagnoses:   Complicated laceration of finger, subsequent encounter - 3rd and 4th fingers right hand   Laceration of right hand without foreign body, initial encounter       8/17/2024   [facility] EMERGENCY DEPT       Abbi Fregoso APRN CNP  08/22/24 0105       Abbi Fregoso APRN CNP  08/22/24 0107

## 2024-08-17 NOTE — DISCHARGE INSTRUCTIONS
Orthopedic consult has been ordered for you if you choose to schedule this before they contact you to schedule within 3 to 5 days the central scheduling phone number is [phone].  Recommend adding Aleve twice daily as needed for pain as this last for 12 hours.  If you develop fever, chills worsening pain, redness or drainage from wounds before being seen by orthopedics please return for further evaluation.  Sutures should stay present until seen by orthopedics unless you are not seen by day 10 after sutures were placed.

## 2024-10-31 DIAGNOSIS — E11.42 DIABETIC SENSORIMOTOR POLYNEUROPATHY (H): ICD-10-CM

## 2024-11-01 RX ORDER — TRAMADOL HYDROCHLORIDE 50 MG/1
50 TABLET ORAL DAILY PRN
Qty: 30 TABLET | Refills: 0 | Status: SHIPPED | OUTPATIENT
Start: 2024-11-01

## 2024-11-01 RX ORDER — PREGABALIN 150 MG/1
150 CAPSULE ORAL 3 TIMES DAILY
Qty: 90 CAPSULE | Refills: 5 | Status: SHIPPED | OUTPATIENT
Start: 2024-11-01

## 2024-11-01 NOTE — TELEPHONE ENCOUNTER
Refill request for: pregabalin 150mg   Directions: Take 1 capsule (150 mg) by mouth 3 times daily       Refill request for: tramadol 50mg   Directions: 1 tablet by mouth once daily as needed for severe pain     LOV: 04/11/24  NOV: 04/11/25    30 day supply with 0 refills Medication T'd for review and signature for tramadol.  30 day supply with 5 refills for pregabalin    Francie Stone LPN on 11/1/2024 at 4:26 PM

## 2024-12-06 DIAGNOSIS — E11.42 DIABETIC SENSORIMOTOR POLYNEUROPATHY (H): ICD-10-CM

## 2024-12-08 ENCOUNTER — HEALTH MAINTENANCE LETTER (OUTPATIENT)
Age: 69
End: 2024-12-08

## 2024-12-09 RX ORDER — TRAMADOL HYDROCHLORIDE 50 MG/1
50 TABLET ORAL DAILY PRN
Qty: 30 TABLET | Refills: 0 | Status: SHIPPED | OUTPATIENT
Start: 2024-12-09

## 2024-12-09 NOTE — TELEPHONE ENCOUNTER
Refill request for: tramadol 50mg   Directions: Take 1 tablet (50 mg) by mouth daily as needed for severe pain. Take 1 tablet by mouth once daily as needed for severe pain     LOV: 04/11/24  NOV: 04/11/25    Last rx sent to pharmacy on 11/1/24.    30 day supply with 0 refills Medication T'd for review and signature    Francie Stone LPN on 12/9/2024 at 2:31 PM

## 2025-01-14 DIAGNOSIS — E11.42 DIABETIC SENSORIMOTOR POLYNEUROPATHY (H): ICD-10-CM

## 2025-01-14 RX ORDER — TRAMADOL HYDROCHLORIDE 50 MG/1
50 TABLET ORAL DAILY PRN
Qty: 30 TABLET | Refills: 0 | Status: SHIPPED | OUTPATIENT
Start: 2025-01-14

## 2025-01-14 NOTE — TELEPHONE ENCOUNTER
Refill request for: traMADol (ULTRAM) 50 MG tablet    Directions: Take 1 tablet (50 mg) by mouth daily as needed for severe pain. Take 1 tablet by mouth once daily as needed for severe pain - Oral     LOV: 4/11/24  NOV: 4/11/25    Last rx sent to pharmacy on 12/9/24.    30 day supply with 0 refills Medication T'd for review and signature    Gissell Logan MA

## 2025-01-23 ENCOUNTER — TRANSCRIBE ORDERS (OUTPATIENT)
Dept: OTHER | Age: 70
End: 2025-01-23

## 2025-01-23 DIAGNOSIS — I25.10 CORONARY ARTERY DISEASE INVOLVING NATIVE CORONARY ARTERY OF NATIVE HEART WITHOUT ANGINA PECTORIS: Primary | ICD-10-CM

## 2025-01-23 DIAGNOSIS — I20.0 UNSTABLE ANGINA (H): ICD-10-CM

## 2025-02-15 ENCOUNTER — HEALTH MAINTENANCE LETTER (OUTPATIENT)
Age: 70
End: 2025-02-15

## 2025-03-15 ENCOUNTER — HEALTH MAINTENANCE LETTER (OUTPATIENT)
Age: 70
End: 2025-03-15

## 2025-04-07 DIAGNOSIS — E11.42 DIABETIC SENSORIMOTOR POLYNEUROPATHY (H): ICD-10-CM

## 2025-04-07 RX ORDER — TRAMADOL HYDROCHLORIDE 50 MG/1
50 TABLET ORAL EVERY 6 HOURS PRN
Qty: 30 TABLET | Refills: 0 | Status: SHIPPED | OUTPATIENT
Start: 2025-04-07

## 2025-04-07 NOTE — TELEPHONE ENCOUNTER
Refill request for the following medication (s) listed below.    Pending Prescriptions:                       Disp   Refills    traMADol (ULTRAM) 50 MG tablet [Pharmacy *30 tab*0            Sig: TAKE 1 TABLET BY MOUTH ONCE DAILY AS NEEDED FOR           SEVERE PAIN      Last office visit provider:  4/11/24  Next appointment scheduled: 4/11/25      Medication T'd for review and signature  Ramez JAMES ATC

## 2025-06-10 ENCOUNTER — TELEPHONE (OUTPATIENT)
Dept: NEUROLOGY | Facility: CLINIC | Age: 70
End: 2025-06-10
Payer: COMMERCIAL

## 2025-06-10 DIAGNOSIS — E11.42 DIABETIC SENSORIMOTOR POLYNEUROPATHY (H): Primary | ICD-10-CM

## 2025-06-10 NOTE — TELEPHONE ENCOUNTER
[facility] Call Center    Phone Message    May a detailed message be left on voicemail: yes     Reason for Call: Medication Question or concern regarding medication   Prescription Clarification  Name of Medication: pregabalin (LYRICA) 200 MG capsule and DULoxetine (CYMBALTA) 30 MG capsule  Prescribing Provider: Dr. Barahona   Pharmacy:    What on the order needs clarification? Lavell stated that these medications were increased, however hasn't helped with his burning feet. Lavell stated that he is now experiencing more fatigue since the increase and is inquiring if there are any other solutions.    Action Taken: Message routed to:  Other: [facility]    Travel Screening: Not Applicable     Date of Service:

## 2025-06-11 NOTE — TELEPHONE ENCOUNTER
"[facility].     LOV 4/11/25  \"1.  Increase Lyrica to 200 mg 3 times daily.  Prescription sent to patient's pharmacy  2.  Discontinue tramadol  3.  Currently he is on duloxetine for back pain and I have increased the dose of duloxetine to 30 mg twice daily  4.  Add alpha lipoic acid 300 mg twice daily  5.  Follow-up in 1 year\"    Korin YOUNG RN, BSN  [facility]    "

## 2025-06-11 NOTE — TELEPHONE ENCOUNTER
[facility] Call Center    Phone Message    May a detailed message be left on voicemail: yes     Reason for Call: Pt returned the call, please call Hardeep at [phone] to discuss further.    Action Taken: Message routed to:  Other: [facility]    Travel Screening: Not Applicable     Date of Service:

## 2025-06-12 NOTE — TELEPHONE ENCOUNTER
Returned call to Lavell. He reports continued struggle with burning in feet causing difficulty in walking. Increased duloxetine and lyrica as recommended at last appointment. Discontinued tramadol but is interested in restarting this. Did not start alpha lipoic acid and is not interested, reports no relief in past. Informed that provider will respond next week when back in clinic. Patient agreeable to this plan.    MD please advise.    Marlena GARRISON RN  [facility]

## 2025-06-16 RX ORDER — TRAMADOL HYDROCHLORIDE 25 MG/1
50 TABLET, COATED ORAL 2 TIMES DAILY
Qty: 60 TABLET | Refills: 5 | Status: SHIPPED | OUTPATIENT
Start: 2025-06-16

## 2025-06-16 NOTE — TELEPHONE ENCOUNTER
RN returned call to Lavell and relayed provider message below. Patient is agreeable to plan.     Korin YOUNG RN, BSN  [facility]

## 2025-06-29 ENCOUNTER — HEALTH MAINTENANCE LETTER (OUTPATIENT)
Age: 70
End: 2025-06-29